# Patient Record
Sex: FEMALE | Race: BLACK OR AFRICAN AMERICAN | Employment: UNEMPLOYED | URBAN - METROPOLITAN AREA
[De-identification: names, ages, dates, MRNs, and addresses within clinical notes are randomized per-mention and may not be internally consistent; named-entity substitution may affect disease eponyms.]

---

## 2018-09-01 ENCOUNTER — APPOINTMENT (OUTPATIENT)
Dept: CT IMAGING | Age: 38
DRG: 313 | End: 2018-09-01
Payer: MEDICAID

## 2018-09-01 ENCOUNTER — HOSPITAL ENCOUNTER (INPATIENT)
Age: 38
LOS: 3 days | Discharge: NURSING, REHAB OR LONG-TERM CARE | DRG: 313 | End: 2018-09-04
Attending: EMERGENCY MEDICINE | Admitting: ORTHOPAEDIC SURGERY
Payer: MEDICAID

## 2018-09-01 ENCOUNTER — APPOINTMENT (OUTPATIENT)
Dept: GENERAL RADIOLOGY | Age: 38
DRG: 313 | End: 2018-09-01
Payer: MEDICAID

## 2018-09-01 DIAGNOSIS — S52.601A CLOSED FRACTURE OF DISTAL ENDS OF RIGHT RADIUS AND ULNA, INITIAL ENCOUNTER: ICD-10-CM

## 2018-09-01 DIAGNOSIS — S52.601A CLOSED FRACTURE DISTAL RADIUS AND ULNA, RIGHT, INITIAL ENCOUNTER: ICD-10-CM

## 2018-09-01 DIAGNOSIS — S52.501A CLOSED FRACTURE DISTAL RADIUS AND ULNA, RIGHT, INITIAL ENCOUNTER: ICD-10-CM

## 2018-09-01 DIAGNOSIS — S52.501A CLOSED FRACTURE OF DISTAL ENDS OF RIGHT RADIUS AND ULNA, INITIAL ENCOUNTER: ICD-10-CM
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
## 2018-09-01 DIAGNOSIS — S82.301A CLOSED FRACTURE OF DISTAL END OF RIGHT TIBIA, UNSPECIFIED FRACTURE MORPHOLOGY, INITIAL ENCOUNTER: ICD-10-CM

## 2018-09-01 PROBLEM — T07.XXXA MULTIPLE FRACTURES: Status: ACTIVE | Noted: 2018-09-01

## 2018-09-01 LAB
ABSOLUTE EOS #: 0 K/UL (ref 0–0.4)
ABSOLUTE IMMATURE GRANULOCYTE: 0 K/UL (ref 0–0.3)
ABSOLUTE LYMPH #: 5.04 K/UL (ref 1–4.8)
ABSOLUTE MONO #: 0.5 K/UL (ref 0.1–0.8)
AMYLASE: 49 U/L (ref 28–100)
ANION GAP SERPL CALCULATED.3IONS-SCNC: 13 MMOL/L (ref 9–17)
ATYPICAL LYMPHOCYTE ABSOLUTE COUNT: 0.34 K/UL
ATYPICAL LYMPHOCYTES: 4 %
BASOPHILS # BLD: 0 % (ref 0–2)
BASOPHILS ABSOLUTE: 0 K/UL (ref 0–0.2)
BUN BLDV-MCNC: 9 MG/DL (ref 6–20)
BUN/CREAT BLD: ABNORMAL (ref 9–20)
CALCIUM SERPL-MCNC: 8.5 MG/DL (ref 8.6–10.4)
CHLORIDE BLD-SCNC: 101 MMOL/L (ref 98–107)
CO2: 20 MMOL/L (ref 20–31)
CREAT SERPL-MCNC: 0.66 MG/DL (ref 0.5–0.9)
DIFFERENTIAL TYPE: ABNORMAL
EOSINOPHILS RELATIVE PERCENT: 0 % (ref 1–4)
ETHANOL PERCENT: <0.01 %
ETHANOL: <10 MG/DL
GFR AFRICAN AMERICAN: >60 ML/MIN
GFR NON-AFRICAN AMERICAN: >60 ML/MIN
GFR SERPL CREATININE-BSD FRML MDRD: ABNORMAL ML/MIN/{1.73_M2}
GFR SERPL CREATININE-BSD FRML MDRD: ABNORMAL ML/MIN/{1.73_M2}
GLUCOSE BLD-MCNC: 171 MG/DL (ref 70–99)
HCG QUALITATIVE: NEGATIVE
HCT VFR BLD CALC: 41.9 % (ref 36.3–47.1)
HEMOGLOBIN: 12.8 G/DL (ref 11.9–15.1)
IMMATURE GRANULOCYTES: 0 %
INR BLD: 1.1
LIPASE: 22 U/L (ref 13–60)
LYMPHOCYTES # BLD: 60 % (ref 24–44)
MCH RBC QN AUTO: 27.5 PG (ref 25.2–33.5)
MCHC RBC AUTO-ENTMCNC: 30.5 G/DL (ref 28.4–34.8)
MCV RBC AUTO: 90.1 FL (ref 82.6–102.9)
MONOCYTES # BLD: 6 % (ref 1–7)
MORPHOLOGY: NORMAL
NRBC AUTOMATED: 0 PER 100 WBC
PDW BLD-RTO: 12.3 % (ref 11.8–14.4)
PLATELET # BLD: ABNORMAL K/UL (ref 138–453)
PLATELET ESTIMATE: ABNORMAL
PLATELET, FLUORESCENCE: NORMAL K/UL (ref 138–453)
PLATELET, IMMATURE FRACTION: NORMAL % (ref 1.1–10.3)
PMV BLD AUTO: ABNORMAL FL (ref 8.1–13.5)
POTASSIUM SERPL-SCNC: 3.6 MMOL/L (ref 3.7–5.3)
PROTHROMBIN TIME: 11.6 SEC (ref 9–12)
RBC # BLD: 4.65 M/UL (ref 3.95–5.11)
RBC # BLD: ABNORMAL 10*6/UL
SEG NEUTROPHILS: 30 % (ref 36–66)
SEGMENTED NEUTROPHILS ABSOLUTE COUNT: 2.52 K/UL (ref 1.8–7.7)
SODIUM BLD-SCNC: 134 MMOL/L (ref 135–144)
WBC # BLD: 8.4 K/UL (ref 3.5–11.3)
WBC # BLD: ABNORMAL 10*3/UL

## 2018-09-01 PROCEDURE — 73590 X-RAY EXAM OF LOWER LEG: CPT

## 2018-09-01 PROCEDURE — 99157 MOD SED OTHER PHYS/QHP EA: CPT

## 2018-09-01 PROCEDURE — 0PSHXZZ REPOSITION RIGHT RADIUS, EXTERNAL APPROACH: ICD-10-PCS | Performed by: ORTHOPAEDIC SURGERY

## 2018-09-01 PROCEDURE — 73090 X-RAY EXAM OF FOREARM: CPT

## 2018-09-01 PROCEDURE — 83036 HEMOGLOBIN GLYCOSYLATED A1C: CPT

## 2018-09-01 PROCEDURE — 71045 X-RAY EXAM CHEST 1 VIEW: CPT

## 2018-09-01 PROCEDURE — 85055 RETICULATED PLATELET ASSAY: CPT

## 2018-09-01 PROCEDURE — 2580000003 HC RX 258: Performed by: STUDENT IN AN ORGANIZED HEALTH CARE EDUCATION/TRAINING PROGRAM

## 2018-09-01 PROCEDURE — 73100 X-RAY EXAM OF WRIST: CPT

## 2018-09-01 PROCEDURE — 6370000000 HC RX 637 (ALT 250 FOR IP): Performed by: EMERGENCY MEDICINE

## 2018-09-01 PROCEDURE — 73600 X-RAY EXAM OF ANKLE: CPT

## 2018-09-01 PROCEDURE — 6360000002 HC RX W HCPCS: Performed by: STUDENT IN AN ORGANIZED HEALTH CARE EDUCATION/TRAINING PROGRAM

## 2018-09-01 PROCEDURE — 99156 MOD SED OTH PHYS/QHP 5/>YRS: CPT

## 2018-09-01 PROCEDURE — 27810 TREATMENT OF ANKLE FRACTURE: CPT

## 2018-09-01 PROCEDURE — 82150 ASSAY OF AMYLASE: CPT

## 2018-09-01 PROCEDURE — 6370000000 HC RX 637 (ALT 250 FOR IP): Performed by: STUDENT IN AN ORGANIZED HEALTH CARE EDUCATION/TRAINING PROGRAM

## 2018-09-01 PROCEDURE — 85610 PROTHROMBIN TIME: CPT

## 2018-09-01 PROCEDURE — 72125 CT NECK SPINE W/O DYE: CPT

## 2018-09-01 PROCEDURE — 6360000002 HC RX W HCPCS: Performed by: EMERGENCY MEDICINE

## 2018-09-01 PROCEDURE — 99254 IP/OBS CNSLTJ NEW/EST MOD 60: CPT | Performed by: INTERNAL MEDICINE

## 2018-09-01 PROCEDURE — 0SSFXZZ REPOSITION RIGHT ANKLE JOINT, EXTERNAL APPROACH: ICD-10-PCS | Performed by: ORTHOPAEDIC SURGERY

## 2018-09-01 PROCEDURE — G0480 DRUG TEST DEF 1-7 CLASSES: HCPCS

## 2018-09-01 PROCEDURE — 85025 COMPLETE CBC W/AUTO DIFF WBC: CPT

## 2018-09-01 PROCEDURE — 25605 CLTX DST RDL FX/EPHYS SEP W/: CPT

## 2018-09-01 PROCEDURE — 1200000000 HC SEMI PRIVATE

## 2018-09-01 PROCEDURE — 80048 BASIC METABOLIC PNL TOTAL CA: CPT

## 2018-09-01 PROCEDURE — 70450 CT HEAD/BRAIN W/O DYE: CPT

## 2018-09-01 PROCEDURE — 83690 ASSAY OF LIPASE: CPT

## 2018-09-01 PROCEDURE — 73610 X-RAY EXAM OF ANKLE: CPT

## 2018-09-01 PROCEDURE — 84703 CHORIONIC GONADOTROPIN ASSAY: CPT

## 2018-09-01 PROCEDURE — 99285 EMERGENCY DEPT VISIT HI MDM: CPT

## 2018-09-01 PROCEDURE — 96374 THER/PROPH/DIAG INJ IV PUSH: CPT

## 2018-09-01 PROCEDURE — 73110 X-RAY EXAM OF WRIST: CPT

## 2018-09-01 PROCEDURE — 0PSKXZZ REPOSITION RIGHT ULNA, EXTERNAL APPROACH: ICD-10-PCS | Performed by: ORTHOPAEDIC SURGERY

## 2018-09-01 PROCEDURE — 96376 TX/PRO/DX INJ SAME DRUG ADON: CPT

## 2018-09-01 RX ORDER — PROPOFOL 10 MG/ML
1 INJECTION, EMULSION INTRAVENOUS ONCE
Status: DISCONTINUED | OUTPATIENT
Start: 2018-09-01 | End: 2018-09-02

## 2018-09-01 RX ORDER — OXYCODONE HYDROCHLORIDE AND ACETAMINOPHEN 5; 325 MG/1; MG/1
1 TABLET ORAL EVERY 4 HOURS PRN
Status: DISCONTINUED | OUTPATIENT
Start: 2018-09-01 | End: 2018-09-02

## 2018-09-01 RX ORDER — HYDRALAZINE HYDROCHLORIDE 20 MG/ML
5 INJECTION INTRAMUSCULAR; INTRAVENOUS EVERY 6 HOURS PRN
Status: DISCONTINUED | OUTPATIENT
Start: 2018-09-01 | End: 2018-09-04 | Stop reason: HOSPADM

## 2018-09-01 RX ORDER — SODIUM CHLORIDE 0.9 % (FLUSH) 0.9 %
10 SYRINGE (ML) INJECTION EVERY 12 HOURS SCHEDULED
Status: DISCONTINUED | OUTPATIENT
Start: 2018-09-01 | End: 2018-09-04 | Stop reason: HOSPADM

## 2018-09-01 RX ORDER — FENTANYL CITRATE 50 UG/ML
INJECTION, SOLUTION INTRAMUSCULAR; INTRAVENOUS
Status: DISCONTINUED
Start: 2018-09-01 | End: 2018-09-01 | Stop reason: WASHOUT

## 2018-09-01 RX ORDER — SODIUM CHLORIDE, SODIUM LACTATE, POTASSIUM CHLORIDE, CALCIUM CHLORIDE 600; 310; 30; 20 MG/100ML; MG/100ML; MG/100ML; MG/100ML
INJECTION, SOLUTION INTRAVENOUS CONTINUOUS
Status: DISCONTINUED | OUTPATIENT
Start: 2018-09-01 | End: 2018-09-02

## 2018-09-01 RX ORDER — PROPOFOL 10 MG/ML
INJECTION, EMULSION INTRAVENOUS CONTINUOUS PRN
Status: COMPLETED | OUTPATIENT
Start: 2018-09-01 | End: 2018-09-01

## 2018-09-01 RX ORDER — MORPHINE SULFATE 4 MG/ML
4 INJECTION, SOLUTION INTRAMUSCULAR; INTRAVENOUS
Status: DISCONTINUED | OUTPATIENT
Start: 2018-09-01 | End: 2018-09-04 | Stop reason: HOSPADM

## 2018-09-01 RX ORDER — FENTANYL CITRATE 50 UG/ML
50 INJECTION, SOLUTION INTRAMUSCULAR; INTRAVENOUS ONCE
Status: COMPLETED | OUTPATIENT
Start: 2018-09-01 | End: 2018-09-01

## 2018-09-01 RX ORDER — OXYCODONE HYDROCHLORIDE AND ACETAMINOPHEN 5; 325 MG/1; MG/1
1 TABLET ORAL EVERY 4 HOURS PRN
Status: DISCONTINUED | OUTPATIENT
Start: 2018-09-01 | End: 2018-09-01

## 2018-09-01 RX ORDER — OXYCODONE HYDROCHLORIDE AND ACETAMINOPHEN 5; 325 MG/1; MG/1
2 TABLET ORAL EVERY 4 HOURS PRN
Status: DISCONTINUED | OUTPATIENT
Start: 2018-09-01 | End: 2018-09-02

## 2018-09-01 RX ORDER — SODIUM CHLORIDE 0.9 % (FLUSH) 0.9 %
10 SYRINGE (ML) INJECTION PRN
Status: DISCONTINUED | OUTPATIENT
Start: 2018-09-01 | End: 2018-09-04 | Stop reason: HOSPADM

## 2018-09-01 RX ADMIN — MORPHINE SULFATE 4 MG: 4 INJECTION INTRAVENOUS at 18:57

## 2018-09-01 RX ADMIN — OXYCODONE HYDROCHLORIDE AND ACETAMINOPHEN 2 TABLET: 5; 325 TABLET ORAL at 21:43

## 2018-09-01 RX ADMIN — Medication 50 MCG: at 16:03

## 2018-09-01 RX ADMIN — FENTANYL CITRATE 50 MCG: 50 INJECTION, SOLUTION INTRAMUSCULAR; INTRAVENOUS at 14:43

## 2018-09-01 RX ADMIN — OXYCODONE HYDROCHLORIDE AND ACETAMINOPHEN 1 TABLET: 5; 325 TABLET ORAL at 18:02

## 2018-09-01 RX ADMIN — FENTANYL CITRATE 50 MCG: 50 INJECTION INTRAMUSCULAR; INTRAVENOUS at 17:22

## 2018-09-01 RX ADMIN — MORPHINE SULFATE 4 MG: 4 INJECTION INTRAVENOUS at 23:22

## 2018-09-01 RX ADMIN — HYDRALAZINE HYDROCHLORIDE 5 MG: 20 INJECTION, SOLUTION INTRAMUSCULAR; INTRAVENOUS at 18:56

## 2018-09-01 RX ADMIN — PROPOFOL 30 MG: 10 INJECTION, EMULSION INTRAVENOUS at 15:33

## 2018-09-01 RX ADMIN — SODIUM CHLORIDE, POTASSIUM CHLORIDE, SODIUM LACTATE AND CALCIUM CHLORIDE: 600; 310; 30; 20 INJECTION, SOLUTION INTRAVENOUS at 18:57

## 2018-09-01 ASSESSMENT — PAIN SCALES - GENERAL
PAINLEVEL_OUTOF10: 5
PAINLEVEL_OUTOF10: 10
PAINLEVEL_OUTOF10: 8
PAINLEVEL_OUTOF10: 9
PAINLEVEL_OUTOF10: 7
PAINLEVEL_OUTOF10: 8
PAINLEVEL_OUTOF10: 7
PAINLEVEL_OUTOF10: 10
PAINLEVEL_OUTOF10: 10

## 2018-09-01 ASSESSMENT — PAIN DESCRIPTION - PAIN TYPE
TYPE: ACUTE PAIN
TYPE: ACUTE PAIN

## 2018-09-01 ASSESSMENT — PAIN DESCRIPTION - LOCATION
LOCATION: ANKLE;WRIST
LOCATION: ANKLE;WRIST

## 2018-09-01 ASSESSMENT — PAIN DESCRIPTION - ORIENTATION
ORIENTATION: RIGHT
ORIENTATION: RIGHT

## 2018-09-01 NOTE — ED NOTES
Pt assisted with bedpan. Pt asking from pain medication, resident notified.       Howard Stanley RN  09/01/18 [id number]

## 2018-09-01 NOTE — ED PROVIDER NOTES
history as documented unless otherwise noted below. Documentation of the HPI, Physical Exam and Medical Decision Making performed by medical students or scribes is based on my personal performance of the HPI, PE and MDM. For Phys Assistant/ Nurse Practitioner cases/documentation I have had a face to face evaluation of this patient and have completed at least one if not all key elements of the E/M (history, physical exam, and MDM). Additional findings are as noted. For APC cases I have personally evaluated and examined the patient in conjunction with the APC and agree with the treatment plan and disposition of the patient as recorded by the APC.     Liliana Mauro MD  Attending Emergency  Physician        Marissa Abbott MD  09/01/18 3592

## 2018-09-01 NOTE — CONSULTS
190 lb (86.2 kg)   SpO2 98%   BMI 32.61 kg/m²     Gen: AAOx3, NAD, cooperative    Head: normocephalic, atraumatic     Neck: supple     Chest: Non labored breathing, b/l clavicles without TTP, crepitus, step off, or deformity   Cardiovascular: Regular rate, no dependent edema, distal pulses 2+     Respiratory: Chest symmetric, no accessory muscle use, normal respirations     RUE: No ecchymosis. Deformity of wrist noted. Skin intact. Tender to palpation. Full AROM without pain shoulder/elbow/fingers. Compartments soft and compressible. Ulnar/Median/AIN/PIN motor intact. Median/Radial/Ulnar nerve SILT. Hand and fingers warm and well-perfused w/ BCR; radial pulse 2+. LUE: No ecchymosis or deformities. Skin intact. Non tender to palpation. Full AROM without pain shoulder/elbow/wrist/fingers. Compartments soft and compressible. Ulnar/Median/AIN/PIN motor intact. Median/Radial/Ulnar nerve SILT. Hand and fingers warm and well-perfused w/ BCR; radial pulse 2+. RLE: No ecchymosis. Moderate swelling. Significant deformities to right ankle. Skin intact. Tender to palpation. Compartments soft and compressible. EHL/FHL/TA/GSC gross motor intact. Sural, saphenous, superificial/deep peroneal, and plantar nerve distribution SILT. Foot and toes warm and well-perfused w/ BCR; DP pulses 2+. -log roll. LLE: No ecchymosis or deformities. Skin intact. Non tender to palpation. Compartments soft and compressible. EHL/FHL/TA/GSC gross motor intact. Sural, saphenous, superificial/deep peroneal, and plantar nerve distribution SILT. Foot and toes warm and well-perfused w/ BCR; DP pulses 2+. -log roll. Knee ligaments grossly intact.     LABS:  Recent Labs      09/01/18   1423   WBC  8.4   HGB  12.8   HCT  41.9   PLT  See Reflexed IPF Result   INR  1.1   NA  134*   K  3.6*   BUN  9   CREATININE  0.66   GLUCOSE  171*        Radiology:   X-rays on 9/1//18 of right wrist demonstrate distal radius/ulna fracture  X-rays on 9/1/18 of right procedure. Under IV sedation by ED staff we proceeded to manually reduce the fracture with appreciable motion indicating improved alignment. At this time post reduction films were obtained demonstrating improved interval alignment. Splint was applied at this point and post splint films were obtained showing a stable reduction. Patient tolerated the procedure well and expressed interval improvement in symptoms. Post reduction neurovascular exam remained unchanged. All questions and concerns were addressed at this point.
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
## 2018-09-01 NOTE — ED NOTES
Pt placed on cardiac monitor, BP cuff, and pulse ox. Alarms set.       Katrin Stanley RN  09/01/18 [id number]
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
## 2018-09-02 ENCOUNTER — APPOINTMENT (OUTPATIENT)
Dept: GENERAL RADIOLOGY | Age: 38
DRG: 313 | End: 2018-09-02
Payer: MEDICAID

## 2018-09-02 ENCOUNTER — ANESTHESIA (OUTPATIENT)
Dept: OPERATING ROOM | Age: 38
DRG: 313 | End: 2018-09-02
Payer: MEDICAID

## 2018-09-02 ENCOUNTER — ANESTHESIA EVENT (OUTPATIENT)
Dept: OPERATING ROOM | Age: 38
DRG: 313 | End: 2018-09-02
Payer: MEDICAID

## 2018-09-02 VITALS — SYSTOLIC BLOOD PRESSURE: 146 MMHG | OXYGEN SATURATION: 98 % | DIASTOLIC BLOOD PRESSURE: 97 MMHG

## 2018-09-02 LAB
ESTIMATED AVERAGE GLUCOSE: 154 MG/DL
HBA1C MFR BLD: 7 % (ref 4–6)

## 2018-09-02 PROCEDURE — 6370000000 HC RX 637 (ALT 250 FOR IP): Performed by: STUDENT IN AN ORGANIZED HEALTH CARE EDUCATION/TRAINING PROGRAM

## 2018-09-02 PROCEDURE — 0QSG04Z REPOSITION RIGHT TIBIA WITH INTERNAL FIXATION DEVICE, OPEN APPROACH: ICD-10-PCS | Performed by: ORTHOPAEDIC SURGERY

## 2018-09-02 PROCEDURE — 0QSJ04Z REPOSITION RIGHT FIBULA WITH INTERNAL FIXATION DEVICE, OPEN APPROACH: ICD-10-PCS | Performed by: ORTHOPAEDIC SURGERY

## 2018-09-02 PROCEDURE — 64447 NJX AA&/STRD FEMORAL NRV IMG: CPT | Performed by: ANESTHESIOLOGY

## 2018-09-02 PROCEDURE — 82306 VITAMIN D 25 HYDROXY: CPT

## 2018-09-02 PROCEDURE — C1713 ANCHOR/SCREW BN/BN,TIS/BN: HCPCS | Performed by: ORTHOPAEDIC SURGERY

## 2018-09-02 PROCEDURE — 6360000002 HC RX W HCPCS: Performed by: NURSE ANESTHETIST, CERTIFIED REGISTERED

## 2018-09-02 PROCEDURE — 6360000002 HC RX W HCPCS: Performed by: STUDENT IN AN ORGANIZED HEALTH CARE EDUCATION/TRAINING PROGRAM

## 2018-09-02 PROCEDURE — 7100000001 HC PACU RECOVERY - ADDTL 15 MIN: Performed by: ORTHOPAEDIC SURGERY

## 2018-09-02 PROCEDURE — 73100 X-RAY EXAM OF WRIST: CPT

## 2018-09-02 PROCEDURE — 1200000000 HC SEMI PRIVATE

## 2018-09-02 PROCEDURE — 64445 NJX AA&/STRD SCIATIC NRV IMG: CPT | Performed by: ANESTHESIOLOGY

## 2018-09-02 PROCEDURE — 94762 N-INVAS EAR/PLS OXIMTRY CONT: CPT

## 2018-09-02 PROCEDURE — 36415 COLL VENOUS BLD VENIPUNCTURE: CPT

## 2018-09-02 PROCEDURE — 7100000000 HC PACU RECOVERY - FIRST 15 MIN: Performed by: ORTHOPAEDIC SURGERY

## 2018-09-02 PROCEDURE — 64415 NJX AA&/STRD BRCH PLXS IMG: CPT | Performed by: ANESTHESIOLOGY

## 2018-09-02 PROCEDURE — C1769 GUIDE WIRE: HCPCS | Performed by: ORTHOPAEDIC SURGERY

## 2018-09-02 PROCEDURE — 3600000004 HC SURGERY LEVEL 4 BASE: Performed by: ORTHOPAEDIC SURGERY

## 2018-09-02 PROCEDURE — 2580000003 HC RX 258: Performed by: STUDENT IN AN ORGANIZED HEALTH CARE EDUCATION/TRAINING PROGRAM

## 2018-09-02 PROCEDURE — 73610 X-RAY EXAM OF ANKLE: CPT

## 2018-09-02 PROCEDURE — 0PSH04Z REPOSITION RIGHT RADIUS WITH INTERNAL FIXATION DEVICE, OPEN APPROACH: ICD-10-PCS | Performed by: ORTHOPAEDIC SURGERY

## 2018-09-02 PROCEDURE — 3700000001 HC ADD 15 MINUTES (ANESTHESIA): Performed by: ORTHOPAEDIC SURGERY

## 2018-09-02 PROCEDURE — 3600000014 HC SURGERY LEVEL 4 ADDTL 15MIN: Performed by: ORTHOPAEDIC SURGERY

## 2018-09-02 PROCEDURE — 2500000003 HC RX 250 WO HCPCS: Performed by: NURSE ANESTHETIST, CERTIFIED REGISTERED

## 2018-09-02 PROCEDURE — 2580000003 HC RX 258: Performed by: EMERGENCY MEDICINE

## 2018-09-02 PROCEDURE — 2720000010 HC SURG SUPPLY STERILE: Performed by: ORTHOPAEDIC SURGERY

## 2018-09-02 PROCEDURE — 3700000000 HC ANESTHESIA ATTENDED CARE: Performed by: ORTHOPAEDIC SURGERY

## 2018-09-02 PROCEDURE — 2709999900 HC NON-CHARGEABLE SUPPLY: Performed by: ORTHOPAEDIC SURGERY

## 2018-09-02 DEVICE — PIN FIX L22MM DIA1.8MM S STL BTTRS VAR ANG LOK T8 STARDRV: Type: IMPLANTABLE DEVICE | Status: FUNCTIONAL

## 2018-09-02 DEVICE — SCREW BNE L34MM DIA3.5MM CORT S STL ST NONCANNULATED LOK: Type: IMPLANTABLE DEVICE | Status: FUNCTIONAL

## 2018-09-02 DEVICE — SCREW BNE L30MM DIA3.5MM CORT S STL ST NONCANNULATED LOK: Type: IMPLANTABLE DEVICE | Status: FUNCTIONAL

## 2018-09-02 DEVICE — IMPLANTABLE DEVICE: Type: IMPLANTABLE DEVICE | Status: FUNCTIONAL

## 2018-09-02 DEVICE — PIN FIX L20MM DIA1.8MM S STL BTTRS VAR ANG LOK T8 STARDRV: Type: IMPLANTABLE DEVICE | Status: FUNCTIONAL

## 2018-09-02 DEVICE — PLATE BNE W22XL54MM STD 9 H R DST RAD VOLAR S STL VAR ANG: Type: IMPLANTABLE DEVICE | Status: FUNCTIONAL

## 2018-09-02 DEVICE — SCREW BNE L16MM DIA2.7MM CORT S STL ST T8 STARDRV RECESS: Type: IMPLANTABLE DEVICE | Status: FUNCTIONAL

## 2018-09-02 DEVICE — SCREW BNE L38MM DIA3.5MM CORT S STL ST NONCANNULATED LOK: Type: IMPLANTABLE DEVICE | Status: FUNCTIONAL

## 2018-09-02 DEVICE — PIN FIX L18MM DIA1.8MM DST RAD S STL BTTRS VAR ANG LOK T8: Type: IMPLANTABLE DEVICE | Status: FUNCTIONAL

## 2018-09-02 DEVICE — SCREW BNE L22MM DIA2.4MM DST RAD VOLAR S STL ST VAR ANG LOK: Type: IMPLANTABLE DEVICE | Status: FUNCTIONAL

## 2018-09-02 DEVICE — SCREW BNE L18MM DIA2.7MM CORT S STL ST T8 STARDRV RECESS: Type: IMPLANTABLE DEVICE | Status: FUNCTIONAL

## 2018-09-02 RX ORDER — CEFAZOLIN SODIUM 1 G/3ML
INJECTION, POWDER, FOR SOLUTION INTRAMUSCULAR; INTRAVENOUS PRN
Status: DISCONTINUED | OUTPATIENT
Start: 2018-09-02 | End: 2018-09-02 | Stop reason: SDUPTHER

## 2018-09-02 RX ORDER — PROPOFOL 10 MG/ML
INJECTION, EMULSION INTRAVENOUS PRN
Status: DISCONTINUED | OUTPATIENT
Start: 2018-09-02 | End: 2018-09-02 | Stop reason: SDUPTHER

## 2018-09-02 RX ORDER — OXYCODONE HYDROCHLORIDE 5 MG/1
5 TABLET ORAL EVERY 4 HOURS PRN
Status: DISCONTINUED | OUTPATIENT
Start: 2018-09-02 | End: 2018-09-04 | Stop reason: HOSPADM

## 2018-09-02 RX ORDER — ONDANSETRON 2 MG/ML
INJECTION INTRAMUSCULAR; INTRAVENOUS PRN
Status: DISCONTINUED | OUTPATIENT
Start: 2018-09-02 | End: 2018-09-02 | Stop reason: SDUPTHER

## 2018-09-02 RX ORDER — KETOROLAC TROMETHAMINE 30 MG/ML
INJECTION, SOLUTION INTRAMUSCULAR; INTRAVENOUS PRN
Status: DISCONTINUED | OUTPATIENT
Start: 2018-09-02 | End: 2018-09-02 | Stop reason: SDUPTHER

## 2018-09-02 RX ORDER — OXYCODONE HYDROCHLORIDE 5 MG/1
10 TABLET ORAL EVERY 4 HOURS PRN
Status: DISCONTINUED | OUTPATIENT
Start: 2018-09-02 | End: 2018-09-04 | Stop reason: HOSPADM

## 2018-09-02 RX ORDER — LIDOCAINE HYDROCHLORIDE 10 MG/ML
INJECTION, SOLUTION EPIDURAL; INFILTRATION; INTRACAUDAL; PERINEURAL PRN
Status: DISCONTINUED | OUTPATIENT
Start: 2018-09-02 | End: 2018-09-02 | Stop reason: SDUPTHER

## 2018-09-02 RX ORDER — GLYCOPYRROLATE 1 MG/5 ML
SYRINGE (ML) INTRAVENOUS PRN
Status: DISCONTINUED | OUTPATIENT
Start: 2018-09-02 | End: 2018-09-02 | Stop reason: SDUPTHER

## 2018-09-02 RX ADMIN — SODIUM CHLORIDE, POTASSIUM CHLORIDE, SODIUM LACTATE AND CALCIUM CHLORIDE: 600; 310; 30; 20 INJECTION, SOLUTION INTRAVENOUS at 08:08

## 2018-09-02 RX ADMIN — PHENYLEPHRINE HYDROCHLORIDE 200 MCG: 10 INJECTION INTRAVENOUS at 08:20

## 2018-09-02 RX ADMIN — OXYCODONE HYDROCHLORIDE 10 MG: 5 TABLET ORAL at 15:32

## 2018-09-02 RX ADMIN — Medication 5 ML: at 07:59

## 2018-09-02 RX ADMIN — PROPOFOL 200 MG: 10 INJECTION, EMULSION INTRAVENOUS at 07:57

## 2018-09-02 RX ADMIN — KETOROLAC TROMETHAMINE 30 MG: 30 INJECTION, SOLUTION INTRAMUSCULAR at 09:34

## 2018-09-02 RX ADMIN — OXYCODONE HYDROCHLORIDE 10 MG: 5 TABLET ORAL at 21:48

## 2018-09-02 RX ADMIN — MORPHINE SULFATE 4 MG: 4 INJECTION INTRAVENOUS at 06:42

## 2018-09-02 RX ADMIN — CEFAZOLIN 2000 MG: 330 INJECTION, POWDER, FOR SOLUTION INTRAMUSCULAR; INTRAVENOUS at 07:43

## 2018-09-02 RX ADMIN — DEXTROSE MONOHYDRATE 2 G: 50 INJECTION, SOLUTION INTRAVENOUS at 15:37

## 2018-09-02 RX ADMIN — Medication 0.2 MG: at 07:59

## 2018-09-02 RX ADMIN — PHENYLEPHRINE HYDROCHLORIDE 200 MCG: 10 INJECTION INTRAVENOUS at 07:59

## 2018-09-02 RX ADMIN — Medication 10 ML: at 21:50

## 2018-09-02 RX ADMIN — PHENYLEPHRINE HYDROCHLORIDE 200 MCG: 10 INJECTION INTRAVENOUS at 08:03

## 2018-09-02 RX ADMIN — LIDOCAINE HYDROCHLORIDE 50 MG: 10 INJECTION, SOLUTION EPIDURAL; INFILTRATION; INTRACAUDAL; PERINEURAL at 07:57

## 2018-09-02 RX ADMIN — ONDANSETRON 4 MG: 2 INJECTION INTRAMUSCULAR; INTRAVENOUS at 09:34

## 2018-09-02 RX ADMIN — OXYCODONE HYDROCHLORIDE AND ACETAMINOPHEN 2 TABLET: 5; 325 TABLET ORAL at 05:43

## 2018-09-02 ASSESSMENT — PULMONARY FUNCTION TESTS
PIF_VALUE: 0
PIF_VALUE: 8
PIF_VALUE: 11
PIF_VALUE: 2
PIF_VALUE: 9
PIF_VALUE: 12
PIF_VALUE: 11
PIF_VALUE: 3
PIF_VALUE: 11
PIF_VALUE: 12
PIF_VALUE: 11
PIF_VALUE: 11
PIF_VALUE: 0
PIF_VALUE: 11
PIF_VALUE: 9
PIF_VALUE: 3
PIF_VALUE: 0
PIF_VALUE: 5
PIF_VALUE: 0
PIF_VALUE: 3
PIF_VALUE: 11
PIF_VALUE: 3
PIF_VALUE: 11
PIF_VALUE: 11
PIF_VALUE: 3
PIF_VALUE: 0
PIF_VALUE: 1
PIF_VALUE: 9
PIF_VALUE: 3
PIF_VALUE: 11
PIF_VALUE: 1
PIF_VALUE: 11
PIF_VALUE: 8
PIF_VALUE: 3
PIF_VALUE: 9
PIF_VALUE: 0
PIF_VALUE: 11
PIF_VALUE: 8
PIF_VALUE: 11
PIF_VALUE: 8
PIF_VALUE: 3
PIF_VALUE: 11
PIF_VALUE: 3
PIF_VALUE: 10
PIF_VALUE: 11
PIF_VALUE: 1
PIF_VALUE: 3
PIF_VALUE: 9
PIF_VALUE: 8
PIF_VALUE: 16
PIF_VALUE: 3
PIF_VALUE: 3
PIF_VALUE: 15
PIF_VALUE: 9
PIF_VALUE: 1
PIF_VALUE: 9
PIF_VALUE: 8
PIF_VALUE: 19
PIF_VALUE: 11
PIF_VALUE: 6
PIF_VALUE: 9
PIF_VALUE: 11
PIF_VALUE: 9
PIF_VALUE: 11
PIF_VALUE: 11
PIF_VALUE: 9
PIF_VALUE: 8
PIF_VALUE: 0
PIF_VALUE: 0
PIF_VALUE: 11
PIF_VALUE: 1
PIF_VALUE: 8
PIF_VALUE: 9
PIF_VALUE: 9
PIF_VALUE: 1
PIF_VALUE: 0
PIF_VALUE: 11
PIF_VALUE: 11
PIF_VALUE: 8
PIF_VALUE: 11
PIF_VALUE: 1
PIF_VALUE: 5
PIF_VALUE: 11
PIF_VALUE: 11
PIF_VALUE: 1
PIF_VALUE: 11
PIF_VALUE: 9
PIF_VALUE: 11
PIF_VALUE: 16
PIF_VALUE: 13
PIF_VALUE: 0
PIF_VALUE: 11
PIF_VALUE: 11
PIF_VALUE: 2
PIF_VALUE: 9
PIF_VALUE: 12
PIF_VALUE: 11
PIF_VALUE: 11
PIF_VALUE: 0
PIF_VALUE: 9
PIF_VALUE: 11
PIF_VALUE: 24
PIF_VALUE: 11
PIF_VALUE: 2
PIF_VALUE: 9
PIF_VALUE: 13
PIF_VALUE: 8
PIF_VALUE: 11
PIF_VALUE: 11
PIF_VALUE: 0
PIF_VALUE: 9
PIF_VALUE: 9
PIF_VALUE: 0
PIF_VALUE: 11
PIF_VALUE: 9
PIF_VALUE: 3
PIF_VALUE: 9
PIF_VALUE: 11
PIF_VALUE: 16
PIF_VALUE: 11
PIF_VALUE: 3
PIF_VALUE: 5
PIF_VALUE: 5
PIF_VALUE: 11
PIF_VALUE: 8
PIF_VALUE: 11
PIF_VALUE: 9
PIF_VALUE: 1
PIF_VALUE: 11
PIF_VALUE: 8
PIF_VALUE: 15
PIF_VALUE: 3
PIF_VALUE: 11
PIF_VALUE: 8

## 2018-09-02 ASSESSMENT — PAIN SCALES - GENERAL
PAINLEVEL_OUTOF10: 8
PAINLEVEL_OUTOF10: 10
PAINLEVEL_OUTOF10: 9
PAINLEVEL_OUTOF10: 0
PAINLEVEL_OUTOF10: 0
PAINLEVEL_OUTOF10: 7

## 2018-09-02 NOTE — PROGRESS NOTES
Progress Note    Patient:  Abhi Lincoln  YOB: 1980     43 y.o. female    Subjective:  Patient seen and examined at bedside this morning. No new complaints or concerns per patient this morning. No acute issues overnight per nursing. Pain well-controlled. Slept well overnight. Evaluated by internal medicine. Plan for ORIF right radius & ulna and ORIF right ankle discussed again with patient this morning. Questions and concerns answered and she is in agreement with ORIF today. Denies: fever/chills, HA, CP, SOB, N/V, dysuria, or numbness/tingling in extremities. -BM/+flatus. PT/OT to evaluate post-op. Objective:   Vitals:    09/01/18 2115   BP: (!) 147/91   Pulse: 79   Resp: 18   Temp:    SpO2: 100%     Gen: NAD, cooperative, sleeping comfortably upon entering room  Cardiovascular: Regular rate, no dependent edema, distal pulses 2+  Respiratory: Chest symmetric, no accessory muscle use, normal respirations  RUE: Sugar-tong splint in place, c/d/i. Compartments soft and compressible. Fingers warm and perfused w/ BCR. Mild fusiform swelling to all digits. Patient actively flexes and extends all digits on command. Median/Radial/Ulnar nerve SILT. RLE: Short leg posterior splint with stirrups in place, c/d/i. Compartments soft and compressible. Patient actively flexes and extends all toes on command. Sural, saphenous, superificial/deep peroneal, and plantar nerve distribution SILT. Foot and exposed toes warm and perfused w/ BCR. Recent Labs      09/01/18   1423   WBC  8.4   HGB  12.8   HCT  41.9   PLT  See Reflexed IPF Result   INR  1.1   NA  134*   K  3.6*   BUN  9   CREATININE  0.66   GLUCOSE  171*     Meds: Lovenox   See rec for complete list    Impression:  -R distal radius and ulna fracture  -R bimalleolar ankle fracture-dislocation    Plan:    -Maintain short leg posterior splint with stirrups and sugar-tong splint in place to RLE and RUE. Keep clean and dry. Do not remove.   -To OR
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
## 2018-09-02 NOTE — ANESTHESIA PROCEDURE NOTES
Peripheral Block    Patient location during procedure: pre-op  Staffing  Anesthesiologist: Jo Rabago  Performed: anesthesiologist   Preanesthetic Checklist  Completed: patient identified, site marked, surgical consent, pre-op evaluation, timeout performed, IV checked, risks and benefits discussed, monitors and equipment checked, anesthesia consent given, oxygen available and patient being monitored  Peripheral Block  Prep: ChloraPrep  Patient monitoring: cardiac monitor, continuous pulse ox, frequent blood pressure checks and IV access  Block type: Sciatic  Laterality: right  Injection technique: single-shot  Procedures: ultrasound guided  Local infiltration: lidocaine  Infiltration strength: 1 %  Dose: 3 mL  Popliteal  Provider prep: mask and sterile gloves  Local infiltration: lidocaine  Needle  Needle gauge: 21 G  Needle length: 10 cm  Needle localization: ultrasound guidance  Assessment  Injection assessment: negative aspiration for heme, no paresthesia on injection and local visualized surrounding nerve on ultrasound  Paresthesia pain: none  Slow fractionated injection: yes  Hemodynamics: stable  Additional Notes  Immediately prior to procedure a \"time out\" was called to verify the correct patient, allergies, laterality, procedure and equipment. Time out performed with Rupal Joseph CRNA    Local Anesthetic: 0.5 %  Bupivacaine   Amount: 25 ml  in 5 ml increments after negative aspiration each time.         Reason for block: post-op pain management and at surgeon's request
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
## 2018-09-02 NOTE — ANESTHESIA PRE PROCEDURE
Department of Anesthesiology  Preprocedure Note       Name:  Anuel Majano   Age:  43 y.o.  :  1980                                          MRN:  [id number]         Date:  2018      Surgeon: Jonah Marr):  Paige River MD    Procedure: Procedure(s):  RADIUS OPEN REDUCTION INTERNAL FIXATION  ANKLE OPEN REDUCTION INTERNAL FIXATION    Medications prior to admission:   Prior to Admission medications    Not on File       Current medications:    Current Facility-Administered Medications   Medication Dose Route Frequency Provider Last Rate Last Dose    propofol 1000 MG/100ML injection 86 mg  1 mg/kg Intravenous Once Telma Kathleen DO        sodium chloride flush 0.9 % injection 10 mL  10 mL Intravenous 2 times per day Lenore Colindres MD        sodium chloride flush 0.9 % injection 10 mL  10 mL Intravenous PRN Lenore Colindres MD        enoxaparin (LOVENOX) injection 40 mg  40 mg Subcutaneous Daily Lenore Colindres MD        ceFAZolin (ANCEF) 2 g in dextrose 5 % 50 mL IVPB  2 g Intravenous On Call to [address] [place] [address],[address], DO   Stopped at 18 0039    lactated ringers infusion   Intravenous Continuous [place] Ulisses Nazario,  mL/hr at 18 1857      morphine (PF) injection 4 mg  4 mg Intravenous Q3H PRN Simonne Fallen, DO   4 mg at 18 0237    oxyCODONE-acetaminophen (PERCOCET) 5-325 MG per tablet 1 tablet  1 tablet Oral Q4H PRN Simonne Fallen, DO        Or    oxyCODONE-acetaminophen (PERCOCET) 5-325 MG per tablet 2 tablet  2 tablet Oral Q4H PRN Simonne Fallen, DO   2 tablet at 18 0543    hydrALAZINE (APRESOLINE) injection 5 mg  5 mg Intravenous Q6H PRN [place] Ulisses Nazario, DO   5 mg at 18 1856       Allergies:  No Known Allergies    Problem List:    Patient Active Problem List   Diagnosis Code    Closed fracture distal radius and ulna, right, initial encounter S52.501A, S52.601A    Multiple fractures T07. Michelel Jhaveri       Past Medical History: 26.5 01/07/2013       HCG (If Applicable): No results found for: PREGTESTUR, PREGSERUM, HCG, HCGQUANT     ABGs: No results found for: PHART, PO2ART, TKA9WMR, EEX1TST, BEART, X5XCVDFY     Type & Screen (If Applicable):  No results found for: LABABO, [address]    Anesthesia Evaluation  Patient summary reviewed and Nursing notes reviewed no history of anesthetic complications:   Airway: Mallampati: II  TM distance: >3 FB   Neck ROM: full  Mouth opening: > = 3 FB Dental: normal exam         Pulmonary:Negative Pulmonary ROS and normal exam                               Cardiovascular:  Exercise tolerance: good (>4 METS),       (-) past MI, CAD, CABG/stent, dysrhythmias,  angina,  CHF, orthopnea and PND       Beta Blocker:  Not on Beta Blocker         Neuro/Psych:   (+) psychiatric history:            GI/Hepatic/Renal: Neg GI/Hepatic/Renal ROS            Endo/Other: Negative Endo/Other ROS                    Abdominal:           Vascular: negative vascular ROS. Anesthesia Plan      general and regional     ASA 2       Induction: intravenous. Anesthetic plan and risks discussed with patient.       Plan discussed with CRNA and surgical team.                  Leigh Augustin MD   9/2/2018
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
## 2018-09-02 NOTE — ANESTHESIA PROCEDURE NOTES
Peripheral Block    Patient location during procedure: pre-op  Staffing  Anesthesiologist: Moreno Zepeda  Performed: anesthesiologist   Preanesthetic Checklist  Completed: patient identified, site marked, surgical consent, pre-op evaluation, timeout performed, IV checked, risks and benefits discussed, monitors and equipment checked, anesthesia consent given, oxygen available and patient being monitored  Peripheral Block  Patient position: sitting  Prep: ChloraPrep  Patient monitoring: cardiac monitor, continuous pulse ox, frequent blood pressure checks and IV access  Block type: Brachial plexus  Laterality: right  Injection technique: single-shot  Procedures: ultrasound guided  Local infiltration: lidocaine  Infiltration strength: 1 %  Dose: 3 mL  Supraclavicular  Provider prep: mask and sterile gloves  Local infiltration: lidocaine  Needle  Needle gauge: 21 G  Needle length: 10 cm  Needle localization: ultrasound guidance  Assessment  Injection assessment: negative aspiration for heme, no paresthesia on injection and local visualized surrounding nerve on ultrasound  Paresthesia pain: none  Slow fractionated injection: yes  Hemodynamics: stable  Additional Notes  Immediately prior to procedure a \"time out\" was called to verify the correct patient, allergies, laterality, procedure and equipment. Time out performed with Anton Rodriguez. CRNA    Local Anesthetic: 0.5 %  Ropivacaine   Amount: 25 ml  in 5 ml increments after negative aspiration each time.         Reason for block: post-op pain management and at surgeon's request

## 2018-09-03 LAB
ABSOLUTE EOS #: <0.03 K/UL (ref 0–0.44)
ABSOLUTE IMMATURE GRANULOCYTE: 0.05 K/UL (ref 0–0.3)
ABSOLUTE LYMPH #: 2.78 K/UL (ref 1.1–3.7)
ABSOLUTE MONO #: 1.4 K/UL (ref 0.1–1.2)
ANION GAP SERPL CALCULATED.3IONS-SCNC: 16 MMOL/L (ref 9–17)
BASOPHILS # BLD: 0 % (ref 0–2)
BASOPHILS ABSOLUTE: 0.05 K/UL (ref 0–0.2)
BUN BLDV-MCNC: 7 MG/DL (ref 6–20)
BUN/CREAT BLD: ABNORMAL (ref 9–20)
CALCIUM SERPL-MCNC: 8.5 MG/DL (ref 8.6–10.4)
CHLORIDE BLD-SCNC: 98 MMOL/L (ref 98–107)
CO2: 22 MMOL/L (ref 20–31)
CREAT SERPL-MCNC: 0.59 MG/DL (ref 0.5–0.9)
DIFFERENTIAL TYPE: ABNORMAL
EOSINOPHILS RELATIVE PERCENT: 0 % (ref 1–4)
GFR AFRICAN AMERICAN: >60 ML/MIN
GFR NON-AFRICAN AMERICAN: >60 ML/MIN
GFR SERPL CREATININE-BSD FRML MDRD: ABNORMAL ML/MIN/{1.73_M2}
GFR SERPL CREATININE-BSD FRML MDRD: ABNORMAL ML/MIN/{1.73_M2}
GLUCOSE BLD-MCNC: 165 MG/DL (ref 70–99)
HCT VFR BLD CALC: 39.9 % (ref 36.3–47.1)
HEMOGLOBIN: 12.6 G/DL (ref 11.9–15.1)
IMMATURE GRANULOCYTES: 0 %
LYMPHOCYTES # BLD: 19 % (ref 24–43)
MCH RBC QN AUTO: 28.2 PG (ref 25.2–33.5)
MCHC RBC AUTO-ENTMCNC: 31.6 G/DL (ref 28.4–34.8)
MCV RBC AUTO: 89.3 FL (ref 82.6–102.9)
MONOCYTES # BLD: 10 % (ref 3–12)
NRBC AUTOMATED: 0 PER 100 WBC
PDW BLD-RTO: 12.5 % (ref 11.8–14.4)
PLATELET # BLD: 340 K/UL (ref 138–453)
PLATELET ESTIMATE: ABNORMAL
PMV BLD AUTO: 9.5 FL (ref 8.1–13.5)
POTASSIUM SERPL-SCNC: 4 MMOL/L (ref 3.7–5.3)
RBC # BLD: 4.47 M/UL (ref 3.95–5.11)
RBC # BLD: ABNORMAL 10*6/UL
SEG NEUTROPHILS: 71 % (ref 36–65)
SEGMENTED NEUTROPHILS ABSOLUTE COUNT: 10.34 K/UL (ref 1.5–8.1)
SODIUM BLD-SCNC: 136 MMOL/L (ref 135–144)
VITAMIN D 25-HYDROXY: 16.1 NG/ML (ref 30–100)
WBC # BLD: 14.6 K/UL (ref 3.5–11.3)
WBC # BLD: ABNORMAL 10*3/UL

## 2018-09-03 PROCEDURE — 97116 GAIT TRAINING THERAPY: CPT

## 2018-09-03 PROCEDURE — 2580000003 HC RX 258: Performed by: STUDENT IN AN ORGANIZED HEALTH CARE EDUCATION/TRAINING PROGRAM

## 2018-09-03 PROCEDURE — 80048 BASIC METABOLIC PNL TOTAL CA: CPT

## 2018-09-03 PROCEDURE — 85025 COMPLETE CBC W/AUTO DIFF WBC: CPT

## 2018-09-03 PROCEDURE — G8979 MOBILITY GOAL STATUS: HCPCS

## 2018-09-03 PROCEDURE — 99232 SBSQ HOSP IP/OBS MODERATE 35: CPT | Performed by: INTERNAL MEDICINE

## 2018-09-03 PROCEDURE — 99253 IP/OBS CNSLTJ NEW/EST LOW 45: CPT | Performed by: PHYSICAL MEDICINE & REHABILITATION

## 2018-09-03 PROCEDURE — 97163 PT EVAL HIGH COMPLEX 45 MIN: CPT

## 2018-09-03 PROCEDURE — 1200000000 HC SEMI PRIVATE

## 2018-09-03 PROCEDURE — 6360000002 HC RX W HCPCS: Performed by: STUDENT IN AN ORGANIZED HEALTH CARE EDUCATION/TRAINING PROGRAM

## 2018-09-03 PROCEDURE — 97530 THERAPEUTIC ACTIVITIES: CPT

## 2018-09-03 PROCEDURE — 6370000000 HC RX 637 (ALT 250 FOR IP): Performed by: STUDENT IN AN ORGANIZED HEALTH CARE EDUCATION/TRAINING PROGRAM

## 2018-09-03 PROCEDURE — 2580000003 HC RX 258: Performed by: EMERGENCY MEDICINE

## 2018-09-03 PROCEDURE — G8978 MOBILITY CURRENT STATUS: HCPCS

## 2018-09-03 PROCEDURE — 36415 COLL VENOUS BLD VENIPUNCTURE: CPT

## 2018-09-03 PROCEDURE — 6360000002 HC RX W HCPCS: Performed by: EMERGENCY MEDICINE

## 2018-09-03 RX ORDER — OXYCODONE HYDROCHLORIDE AND ACETAMINOPHEN 5; 325 MG/1; MG/1
1 TABLET ORAL EVERY 4 HOURS PRN
Qty: 42 TABLET | Refills: 0 | Status: SHIPPED | OUTPATIENT
Start: 2018-09-03 | End: 2018-09-10

## 2018-09-03 RX ORDER — ERGOCALCIFEROL 1.25 MG/1
50000 CAPSULE ORAL WEEKLY
Qty: 8 CAPSULE | Refills: 0 | Status: SHIPPED | OUTPATIENT
Start: 2018-09-03 | End: 2022-03-15

## 2018-09-03 RX ORDER — DOCUSATE SODIUM 100 MG/1
100 CAPSULE, LIQUID FILLED ORAL 2 TIMES DAILY PRN
Qty: 30 CAPSULE | Refills: 0 | Status: SHIPPED | OUTPATIENT
Start: 2018-09-03 | End: 2022-03-15

## 2018-09-03 RX ADMIN — OXYCODONE HYDROCHLORIDE 10 MG: 5 TABLET ORAL at 22:31

## 2018-09-03 RX ADMIN — DEXTROSE MONOHYDRATE 2 G: 50 INJECTION, SOLUTION INTRAVENOUS at 00:26

## 2018-09-03 RX ADMIN — ENOXAPARIN SODIUM 40 MG: 40 INJECTION SUBCUTANEOUS at 08:54

## 2018-09-03 RX ADMIN — OXYCODONE HYDROCHLORIDE 10 MG: 5 TABLET ORAL at 06:28

## 2018-09-03 RX ADMIN — OXYCODONE HYDROCHLORIDE 10 MG: 5 TABLET ORAL at 17:16

## 2018-09-03 RX ADMIN — Medication 10 ML: at 08:54

## 2018-09-03 RX ADMIN — MORPHINE SULFATE 4 MG: 4 INJECTION INTRAVENOUS at 00:26

## 2018-09-03 RX ADMIN — OXYCODONE HYDROCHLORIDE 10 MG: 5 TABLET ORAL at 10:58

## 2018-09-03 RX ADMIN — Medication 10 ML: at 20:57

## 2018-09-03 ASSESSMENT — PAIN DESCRIPTION - PAIN TYPE: TYPE: ACUTE PAIN;SURGICAL PAIN

## 2018-09-03 ASSESSMENT — PAIN SCALES - GENERAL
PAINLEVEL_OUTOF10: 10
PAINLEVEL_OUTOF10: 7
PAINLEVEL_OUTOF10: 10
PAINLEVEL_OUTOF10: 8
PAINLEVEL_OUTOF10: 10
PAINLEVEL_OUTOF10: 7

## 2018-09-03 NOTE — OP NOTE
right thigh. Both extremities were prepped and  draped out in sterile manner. We had 2 teams working simultaneously. Beginning on the arm, after a standard time-out, the arm was exsanguinated  with an Esmarch bandage, and the tourniquet was inflated to 250 mmHg. A  standard volar approach to the distal radius was made with a 15-blade along  the FCR tendon. We sharply dissected right down onto the tendon and  retracted the tendon radially. I incised through the floor of the FCR  sheath, and then with the finger, just bluntly dissected down to bone. The  pronator quadratus was incised along its radial margin with a Bovie and  with a Key elevator elevated from radial to ulnar. The self-retaining  retractor was placed. The fracture was very distal, so the distal pieces  were thin. There was a comminuted piece of volar cortex along the ulnar  side. We cleaned everything up a little bit, reduced the fracture. I used  the [employer] variable-angle distal radius plates, selected a plate, and  placed it along the volar cortex. We secured it with a couple of K-wires. C-arm was brought in. We adjusted the plate slightly until we were happy  with the position. We started filling first, from proximal, the shaft  screws. All 3 of those were placed and then we started filling the distal  ones using predominantly buttress posts rather than screws. There was one  screw placed in one distal hole. As we gained fixation, the K-wires were  removed. Final AP and lateral and elevated oblique films were obtained. I  was happy with the fixation. The 2 screws in the lunate fossa were really  close to the joint. I do not think they penetrated, but I did back him  out, re-drilled, and just re-directed them so that there was no question  that they were out of the joint. We were capturing the distal fragments  with our screws. She does have a little volar ulnar piece.   I think that  was a loose piece of just cortical bone
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
## 2018-09-03 NOTE — PROGRESS NOTES
OBS/CDU   RESIDENT NOTE      Patients PCP is:  . None (Inactive)        SUBJECTIVE      No acute events overnight. Has been able to tolerate a full diet without nausea or vomiting. The patient is urinating on his own and is passing flatus. Denies fever, chills, nausea, vomiting, chest pain, shortness of breath, abdominal pain, focal weakness, numbness, tingling, urinary/bowel symptoms, vision changes, visual hallucinations, or headache. PHYSICAL EXAM      General: . Patient sleeping when entered room, but easily arousable. NAD, AO X 3  Heent: no scleral icterus or conjunctival injection. Neck: SUPPLE, NO JVD  Cardiovascular: RRR, S1S2  Pulmonary: CTAB, NO SOB  Abdomen: SOFT, NTTP, ND, +BS  Extremities: splints in place over the RUE and RLE. Neuro / Psych: NO NUMBNESS OR TINGLING, MENTATION AT BASELINE. Patient able to wiggle fingers and toes on right. Sensation intact. PERTINENT TEST /EXAMS      I have reviewed all available laboratory results. MEDICATIONS CURRENT     oxyCODONE (ROXICODONE) immediate release tablet 5 mg Q4H PRN   Or    oxyCODONE (ROXICODONE) immediate release tablet 10 mg Q4H PRN   sodium chloride flush 0.9 % injection 10 mL 2 times per day   sodium chloride flush 0.9 % injection 10 mL PRN   enoxaparin (LOVENOX) injection 40 mg Daily   morphine (PF) injection 4 mg Q3H PRN   hydrALAZINE (APRESOLINE) injection 5 mg Q6H PRN     All medication charted and reviewed. CONSULTS      IP CONSULT TO ORTHOPEDIC SURGERY  IP CONSULT TO INTERNAL MEDICINE    ASSESSMENT/PLAN       Royce Gunderson is a 43 y.o. female who presents with:    1. Acute onset of right forearm and right lower leg pain, secondary to right distal radius/ulnar fracture and right bimalleolar fracture:  -patient s/p ORIF of fractures  -PO Roxicodone for pain.   -Prophylactic IV Ancef   -SQ lovenox for DVT prophylaxis  -PT/OT evaluated patient and recommend inpatient rehab or SNF placement.    -PMR consulted and recommends SNF
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
## 2018-09-04 VITALS
HEIGHT: 64 IN | DIASTOLIC BLOOD PRESSURE: 93 MMHG | OXYGEN SATURATION: 96 % | SYSTOLIC BLOOD PRESSURE: 144 MMHG | BODY MASS INDEX: 32.44 KG/M2 | TEMPERATURE: 98.8 F | RESPIRATION RATE: 16 BRPM | HEART RATE: 80 BPM | WEIGHT: 190 LBS

## 2018-09-04 LAB
ABSOLUTE EOS #: 0.05 K/UL (ref 0–0.44)
ABSOLUTE IMMATURE GRANULOCYTE: 0.06 K/UL (ref 0–0.3)
ABSOLUTE LYMPH #: 2.71 K/UL (ref 1.1–3.7)
ABSOLUTE MONO #: 1.42 K/UL (ref 0.1–1.2)
ANION GAP SERPL CALCULATED.3IONS-SCNC: 11 MMOL/L (ref 9–17)
BASOPHILS # BLD: 0 % (ref 0–2)
BASOPHILS ABSOLUTE: 0.04 K/UL (ref 0–0.2)
BUN BLDV-MCNC: 5 MG/DL (ref 6–20)
BUN/CREAT BLD: ABNORMAL (ref 9–20)
CALCIUM SERPL-MCNC: 8.4 MG/DL (ref 8.6–10.4)
CHLORIDE BLD-SCNC: 98 MMOL/L (ref 98–107)
CO2: 26 MMOL/L (ref 20–31)
CREAT SERPL-MCNC: 0.48 MG/DL (ref 0.5–0.9)
DIFFERENTIAL TYPE: ABNORMAL
EOSINOPHILS RELATIVE PERCENT: 0 % (ref 1–4)
GFR AFRICAN AMERICAN: >60 ML/MIN
GFR NON-AFRICAN AMERICAN: >60 ML/MIN
GFR SERPL CREATININE-BSD FRML MDRD: ABNORMAL ML/MIN/{1.73_M2}
GFR SERPL CREATININE-BSD FRML MDRD: ABNORMAL ML/MIN/{1.73_M2}
GLUCOSE BLD-MCNC: 126 MG/DL (ref 70–99)
HCT VFR BLD CALC: 41.4 % (ref 36.3–47.1)
HEMOGLOBIN: 12.8 G/DL (ref 11.9–15.1)
IMMATURE GRANULOCYTES: 0 %
LYMPHOCYTES # BLD: 20 % (ref 24–43)
MCH RBC QN AUTO: 27.6 PG (ref 25.2–33.5)
MCHC RBC AUTO-ENTMCNC: 30.9 G/DL (ref 28.4–34.8)
MCV RBC AUTO: 89.4 FL (ref 82.6–102.9)
MONOCYTES # BLD: 10 % (ref 3–12)
NRBC AUTOMATED: 0 PER 100 WBC
PDW BLD-RTO: 12.8 % (ref 11.8–14.4)
PLATELET # BLD: 340 K/UL (ref 138–453)
PLATELET ESTIMATE: ABNORMAL
PMV BLD AUTO: 9.7 FL (ref 8.1–13.5)
POTASSIUM SERPL-SCNC: 3.9 MMOL/L (ref 3.7–5.3)
RBC # BLD: 4.63 M/UL (ref 3.95–5.11)
RBC # BLD: ABNORMAL 10*6/UL
SEG NEUTROPHILS: 70 % (ref 36–65)
SEGMENTED NEUTROPHILS ABSOLUTE COUNT: 9.51 K/UL (ref 1.5–8.1)
SODIUM BLD-SCNC: 135 MMOL/L (ref 135–144)
WBC # BLD: 13.8 K/UL (ref 3.5–11.3)
WBC # BLD: ABNORMAL 10*3/UL

## 2018-09-04 PROCEDURE — G8987 SELF CARE CURRENT STATUS: HCPCS

## 2018-09-04 PROCEDURE — 97166 OT EVAL MOD COMPLEX 45 MIN: CPT

## 2018-09-04 PROCEDURE — 97535 SELF CARE MNGMENT TRAINING: CPT

## 2018-09-04 PROCEDURE — 6370000000 HC RX 637 (ALT 250 FOR IP): Performed by: STUDENT IN AN ORGANIZED HEALTH CARE EDUCATION/TRAINING PROGRAM

## 2018-09-04 PROCEDURE — 6360000002 HC RX W HCPCS: Performed by: EMERGENCY MEDICINE

## 2018-09-04 PROCEDURE — G8988 SELF CARE GOAL STATUS: HCPCS

## 2018-09-04 PROCEDURE — 97116 GAIT TRAINING THERAPY: CPT

## 2018-09-04 PROCEDURE — 80048 BASIC METABOLIC PNL TOTAL CA: CPT

## 2018-09-04 PROCEDURE — 85025 COMPLETE CBC W/AUTO DIFF WBC: CPT

## 2018-09-04 PROCEDURE — 99232 SBSQ HOSP IP/OBS MODERATE 35: CPT | Performed by: INTERNAL MEDICINE

## 2018-09-04 PROCEDURE — 2580000003 HC RX 258: Performed by: EMERGENCY MEDICINE

## 2018-09-04 PROCEDURE — G0108 DIAB MANAGE TRN  PER INDIV: HCPCS

## 2018-09-04 PROCEDURE — 36415 COLL VENOUS BLD VENIPUNCTURE: CPT

## 2018-09-04 RX ORDER — ERGOCALCIFEROL 1.25 MG/1
50000 CAPSULE ORAL WEEKLY
Qty: 8 CAPSULE | Refills: 0 | Status: SHIPPED | OUTPATIENT
Start: 2018-09-04 | End: 2022-03-15

## 2018-09-04 RX ORDER — NICOTINE 21 MG/24HR
1 PATCH, TRANSDERMAL 24 HOURS TRANSDERMAL DAILY
Status: DISCONTINUED | OUTPATIENT
Start: 2018-09-04 | End: 2018-09-04 | Stop reason: HOSPADM

## 2018-09-04 RX ADMIN — Medication 10 ML: at 08:28

## 2018-09-04 RX ADMIN — OXYCODONE HYDROCHLORIDE 10 MG: 5 TABLET ORAL at 07:37

## 2018-09-04 RX ADMIN — ENOXAPARIN SODIUM 40 MG: 40 INJECTION SUBCUTANEOUS at 08:28

## 2018-09-04 RX ADMIN — OXYCODONE HYDROCHLORIDE 10 MG: 5 TABLET ORAL at 19:07

## 2018-09-04 RX ADMIN — OXYCODONE HYDROCHLORIDE 10 MG: 5 TABLET ORAL at 11:32

## 2018-09-04 RX ADMIN — METFORMIN HYDROCHLORIDE 500 MG: 500 TABLET ORAL at 08:28

## 2018-09-04 RX ADMIN — OXYCODONE HYDROCHLORIDE 10 MG: 5 TABLET ORAL at 15:20

## 2018-09-04 RX ADMIN — OXYCODONE HYDROCHLORIDE 10 MG: 5 TABLET ORAL at 03:28

## 2018-09-04 ASSESSMENT — PAIN SCALES - GENERAL
PAINLEVEL_OUTOF10: 8
PAINLEVEL_OUTOF10: 6
PAINLEVEL_OUTOF10: 7
PAINLEVEL_OUTOF10: 9
PAINLEVEL_OUTOF10: 4
PAINLEVEL_OUTOF10: 7
PAINLEVEL_OUTOF10: 2
PAINLEVEL_OUTOF10: 4
PAINLEVEL_OUTOF10: 6

## 2018-09-04 ASSESSMENT — ENCOUNTER SYMPTOMS
ABDOMINAL PAIN: 0
VOMITING: 0
SHORTNESS OF BREATH: 0
COUGH: 0
NAUSEA: 0
BACK PAIN: 0

## 2018-09-04 ASSESSMENT — PAIN DESCRIPTION - LOCATION
LOCATION: ANKLE;WRIST
LOCATION: ANKLE;WRIST

## 2018-09-04 ASSESSMENT — PAIN DESCRIPTION - ORIENTATION
ORIENTATION: RIGHT
ORIENTATION: RIGHT

## 2018-09-04 ASSESSMENT — PAIN DESCRIPTION - DESCRIPTORS: DESCRIPTORS: ACHING;DISCOMFORT;SHARP

## 2018-09-04 ASSESSMENT — PAIN DESCRIPTION - FREQUENCY: FREQUENCY: CONTINUOUS

## 2018-09-04 ASSESSMENT — PAIN DESCRIPTION - PROGRESSION: CLINICAL_PROGRESSION: NOT CHANGED

## 2018-09-04 ASSESSMENT — PAIN DESCRIPTION - PAIN TYPE
TYPE: ACUTE PAIN;SURGICAL PAIN
TYPE: ACUTE PAIN;SURGICAL PAIN

## 2018-09-04 ASSESSMENT — PAIN DESCRIPTION - ONSET: ONSET: ON-GOING

## 2018-09-04 NOTE — CARE COORDINATION
Transition planning, met with patient to discuss snf choice, [facility] provider list provided agreeable to referral to [facility]. Called [facility] admissions spoke to 1970 Cathleen Torres will review case  17:00 dc order in, sarah Yang doing onsite can accept today.  Ps dr Lottie Wakefield ok to go, will request [facility]  @ 7   17:45 called myron elizondo spoke to [address] Isela said I had to call [facility] for pa. called [facility] will call me back with confirmed  time  17:48 received call from [facility] with confirmed  time 7:30-8;00

## 2018-09-04 NOTE — PROGRESS NOTES
[facility]  Internal Medicine Residency Program  Inpatient Daily Progress Note  ______________________________________________________________________________    Patient: Danette Morrissey  YOB: 1980   MRN: [id number]    Acct: [de-identified]     Admit date: 9/1/2018  Today's date: 09/04/18  Number of days in the hospital: 3  Expected Discharge Date: 09/03/18    Admitting Diagnosis: Closed fracture distal radius and ulna, right, initial encounter    Subjective:   Pt seen and Chart reviewed. No significant overnight issues.   Tolerating metformin     Objective:   Vital Sign:  BP (!) 150/94   Pulse 80   Temp 98 °F (36.7 °C) (Oral)   Resp 18   Ht 5' 4\" (1.626 m)   Wt 190 lb (86.2 kg)   SpO2 99%   BMI 32.61 kg/m²       Physical Exam:  General appearance:   alert, well appearing, and in no distress  Mental Status: alert, oriented to person, place, and time  Neurologic:  alert, oriented, normal speech, no focal findings or movement disorder noted  Lungs:  clear to auscultation, no wheezes, rales or rhonchi, symmetric air entry  Heart[de-identified] normal rate, regular rhythm, normal S1, S2, no murmurs, rubs, clicks or gallops  Abdomen:  soft, nontender, nondistended, no masses or organomegaly  Extremities: peripheral pulses normal, no pedal edema, no clubbing or cyanosis   Skin: normal coloration and turgor, no rashes, no suspicious skin lesions noted    Medications:  Scheduled Medications   metFORMIN  500 mg Oral Daily with breakfast    sodium chloride flush  10 mL Intravenous 2 times per day    enoxaparin  40 mg Subcutaneous Daily       PRN Medications    oxyCODONE 5 mg Q4H PRN   Or     oxyCODONE 10 mg Q4H PRN   sodium chloride flush 10 mL PRN   morphine 4 mg Q3H PRN   hydrALAZINE 5 mg Q6H PRN       Diagnostic Labs and Imaging:  CBC:  Recent Labs      09/01/18   1423  09/03/18   0929  09/04/18   0619   WBC  8.4  14.6*  13.8*   HGB  12.8  12.6  12.8   PLT  See Reflexed
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
## 2018-09-04 NOTE — PROGRESS NOTES
in Pain: Yes  Pain Assessment  Pain Assessment: 0-10  Pain Level: 9  Pain Type: Acute pain;Surgical pain  Pain Location: Ankle; Wrist  Pain Orientation: Right  Pain Descriptors: Aching;Discomfort; Sharp  Pain Frequency: Continuous  Pain Onset: On-going  Clinical Progression: Not changed  Pain Intervention(s): Ambulation/Increased activity; Therapeutic presence;Distraction  Response to Pain Intervention: Patient Satisfied  Vital Signs  Patient Currently in Pain: Yes       Orientation  Orientation  Overall Orientation Status: Within Functional Limits  Objective   Bed mobility  Rolling to Right: Contact guard assistance  Supine to Sit: Minimal assistance  Sit to Supine: Minimal assistance  Scooting: Contact guard assistance  Comment: vc's for maintaining NWBing R UE/LE with fair return demo  Transfers  Sit to Stand: Minimal Assistance  Stand to sit: Minimal Assistance  Comment: 3 trials STS requiring Gini   Ambulation  Ambulation?: Yes  Ambulation 1  Surface: level tile  Device: Platform Walker right  Assistance: Minimal assistance  Quality of Gait: 3 point gait pattern with platform walker and MIN A for propulsion. able to maintain NWB RUE and RLE (pt able to bear weight through R elbow)  Distance: 5ft forward and back  Comments: pt is unsteady throughout heavily reliant of platform walker for support with multiple partial LOB requiring MIN A to recover  Stairs/Curb  Stairs?: No     Balance  Posture: Good  Sitting - Static: Fair;+  Sitting - Dynamic: Fair;+  Standing - Static: Fair;-  Standing - Dynamic: Fair;-  Comments: standing balance assessed with platform walker  Exercises  Comments: pt deferred d/t pain and fatigue, pt self limiting d/t report of increased pain and fatigue. Pt perseverating on \"wanting a cigarette\", difficult to redirect. Assessment   Body structures, Functions, Activity limitations: Decreased functional mobility ; Decreased ADL status; Decreased balance;Decreased ROM; Decreased
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
## 2018-09-10 ENCOUNTER — HOSPITAL ENCOUNTER (EMERGENCY)
Age: 38
Discharge: NURSING, REHAB OR LONG-TERM CARE | End: 2018-09-10
Attending: EMERGENCY MEDICINE
Payer: MEDICAID

## 2018-09-10 VITALS
WEIGHT: 190 LBS | HEART RATE: 78 BPM | DIASTOLIC BLOOD PRESSURE: 76 MMHG | OXYGEN SATURATION: 100 % | HEIGHT: 64 IN | SYSTOLIC BLOOD PRESSURE: 113 MMHG | RESPIRATION RATE: 18 BRPM | BODY MASS INDEX: 32.44 KG/M2 | TEMPERATURE: 98.1 F

## 2018-09-10 DIAGNOSIS — Z98.890 STATUS POST OPEN REDUCTION WITH INTERNAL FIXATION (ORIF) OF FRACTURE OF ANKLE: Primary | ICD-10-CM

## 2018-09-10 DIAGNOSIS — S52.501A CLOSED FRACTURE DISTAL RADIUS AND ULNA, RIGHT, INITIAL ENCOUNTER: ICD-10-CM

## 2018-09-10 DIAGNOSIS — Z87.81 STATUS POST OPEN REDUCTION WITH INTERNAL FIXATION (ORIF) OF FRACTURE OF ANKLE: Primary | ICD-10-CM

## 2018-09-10 DIAGNOSIS — S52.601A CLOSED FRACTURE DISTAL RADIUS AND ULNA, RIGHT, INITIAL ENCOUNTER: ICD-10-CM

## 2018-09-10 PROCEDURE — 6370000000 HC RX 637 (ALT 250 FOR IP): Performed by: STUDENT IN AN ORGANIZED HEALTH CARE EDUCATION/TRAINING PROGRAM

## 2018-09-10 PROCEDURE — 97161 PT EVAL LOW COMPLEX 20 MIN: CPT

## 2018-09-10 PROCEDURE — G8988 SELF CARE GOAL STATUS: HCPCS

## 2018-09-10 PROCEDURE — 97535 SELF CARE MNGMENT TRAINING: CPT

## 2018-09-10 PROCEDURE — G8978 MOBILITY CURRENT STATUS: HCPCS

## 2018-09-10 PROCEDURE — 97165 OT EVAL LOW COMPLEX 30 MIN: CPT

## 2018-09-10 PROCEDURE — G8979 MOBILITY GOAL STATUS: HCPCS

## 2018-09-10 PROCEDURE — 99283 EMERGENCY DEPT VISIT LOW MDM: CPT

## 2018-09-10 PROCEDURE — G8987 SELF CARE CURRENT STATUS: HCPCS

## 2018-09-10 PROCEDURE — 97116 GAIT TRAINING THERAPY: CPT

## 2018-09-10 RX ORDER — HYDROCODONE BITARTRATE AND ACETAMINOPHEN 5; 325 MG/1; MG/1
1 TABLET ORAL ONCE
Status: COMPLETED | OUTPATIENT
Start: 2018-09-10 | End: 2018-09-10

## 2018-09-10 RX ORDER — HYDROCODONE BITARTRATE AND ACETAMINOPHEN 5; 325 MG/1; MG/1
1 TABLET ORAL EVERY 6 HOURS PRN
Qty: 10 TABLET | Refills: 0 | Status: SHIPPED | OUTPATIENT
Start: 2018-09-10 | End: 2018-09-13

## 2018-09-10 RX ADMIN — HYDROCODONE BITARTRATE AND ACETAMINOPHEN 1 TABLET: 5; 325 TABLET ORAL at 12:28

## 2018-09-10 RX ADMIN — HYDROCODONE BITARTRATE AND ACETAMINOPHEN 1 TABLET: 5; 325 TABLET ORAL at 15:40

## 2018-09-10 ASSESSMENT — PAIN SCALES - GENERAL
PAINLEVEL_OUTOF10: 7
PAINLEVEL_OUTOF10: 10
PAINLEVEL_OUTOF10: 10
PAINLEVEL_OUTOF10: 7
PAINLEVEL_OUTOF10: 7
PAINLEVEL_OUTOF10: 10

## 2018-09-10 ASSESSMENT — PAIN DESCRIPTION - LOCATION
LOCATION: WRIST
LOCATION: ANKLE
LOCATION: WRIST;ANKLE

## 2018-09-10 ASSESSMENT — PAIN DESCRIPTION - ORIENTATION
ORIENTATION: RIGHT

## 2018-09-10 ASSESSMENT — PAIN DESCRIPTION - PAIN TYPE
TYPE: ACUTE PAIN

## 2018-09-10 ASSESSMENT — ENCOUNTER SYMPTOMS
BACK PAIN: 0
SHORTNESS OF BREATH: 1
GASTROINTESTINAL NEGATIVE: 1
CHEST TIGHTNESS: 0

## 2018-09-10 ASSESSMENT — PAIN DESCRIPTION - FREQUENCY: FREQUENCY: CONTINUOUS

## 2018-09-10 ASSESSMENT — PAIN DESCRIPTION - DESCRIPTORS: DESCRIPTORS: CONSTANT;THROBBING;SHOOTING

## 2018-09-10 NOTE — CARE COORDINATION
[facility] spoke with pt/ot. SNF being recommended. [facility] also spoke with Janell. Pt is accepted at [facility] location. Pt to go to [facility].

## 2018-09-10 NOTE — CARE COORDINATION
Sw spoke with pt. About sending information to [facility] (SNF's). Pt was agreeable. [facility] faxed over facesheet to both facilities. Waiting for follow up.
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
## 2018-09-10 NOTE — PROGRESS NOTES
floor)  Home Access: Stairs to enter with rails  Entrance Stairs - Number of Steps: 4  Entrance Stairs - Rails: Right  Bathroom Toilet: Standard  Bathroom Equipment: Grab bars in shower  ADL Assistance: Independent  Homemaking Assistance: Independent  Ambulation Assistance: Independent  Transfer Assistance: Independent  Active : Yes  Mode of Transportation: Car  Occupation: Part time employment ([employer])  Type of occupation: [employer]       Objective   Vision: Within Functional Limits  Hearing: Within functional limits    Orientation  Overall Orientation Status: Within Functional Limits  Observation/Palpation  Posture: Fair  Observation: NWB R LE and R UE- platform walker for mob. Pt is forwared flexed and very guarded. cues to slow down for inc. safety. Pt reporting high pain levels at rest and with mob  Balance  Sitting Balance: Stand by assistance  Standing Balance: Stand by assistance  Standing Balance  Sit to stand: Contact guard assistance  Stand to sit: Contact guard assistance  Functional Mobility  Functional - Mobility Device: Platform walker  Assist Level: Minimal assistance  Functional Mobility Comments: CGA-min A for safety; cues to slow down and assist to position UE safely in platform. Pt does well keeping wt off R LE. Pt completed mob from bed in ER to doorway and back  ADL  Feeding: Setup (difficulty using non-dominant hand)  Grooming: Minimal assistance  UE Bathing: Moderate assistance  LE Bathing: Moderate assistance;Maximum assistance  UE Dressing: Moderate assistance  LE Dressing: Moderate assistance;Maximum assistance  Toileting:  Moderate assistance  Tone RUE  RUE Tone: Normotonic  Tone LUE  LUE Tone: Normotonic  Coordination  Movements Are Fluid And Coordinated: Yes     Bed mobility  Rolling to Left: Independent  Rolling to Right: Independent  Supine to Sit: Contact guard assistance  Sit to Supine: Contact guard assistance  Scooting: Contact guard assistance  Transfers  Sit to stand: Contact
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
## 2022-03-15 ENCOUNTER — HOSPITAL ENCOUNTER (EMERGENCY)
Age: 42
Discharge: LEFT BEFORE DISCHARGE | End: 2022-03-15

## 2022-03-15 ENCOUNTER — HOSPITAL ENCOUNTER (INPATIENT)
Age: 42
LOS: 6 days | Discharge: HOME | DRG: 751 | End: 2022-03-21
Attending: EMERGENCY MEDICINE | Admitting: PSYCHIATRY & NEUROLOGY
Payer: MEDICAID

## 2022-03-15 DIAGNOSIS — R45.851 SUICIDAL IDEATIONS: Primary | ICD-10-CM

## 2022-03-15 PROBLEM — F23 ACUTE PSYCHOSIS (HCC): Status: ACTIVE | Noted: 2022-03-15

## 2022-03-15 LAB
ABSOLUTE EOS #: 0 K/UL (ref 0–0.4)
ABSOLUTE LYMPH #: 3.37 K/UL (ref 1–4.8)
ABSOLUTE MONO #: 0.38 K/UL (ref 0.1–1.3)
ACETAMINOPHEN LEVEL: <5 UG/ML (ref 10–30)
AMPHETAMINE SCREEN URINE: POSITIVE
ANION GAP SERPL CALCULATED.3IONS-SCNC: 13 MMOL/L (ref 9–17)
ATYPICAL LYMPHOCYTE ABSOLUTE COUNT: 0.75 K/UL
ATYPICAL LYMPHOCYTES: 10 %
BACTERIA: ABNORMAL
BARBITURATE SCREEN URINE: NEGATIVE
BASOPHILS # BLD: 0 % (ref 0–2)
BASOPHILS ABSOLUTE: 0 K/UL (ref 0–0.2)
BENZODIAZEPINE SCREEN, URINE: NEGATIVE
BILIRUBIN URINE: NEGATIVE
BUN BLDV-MCNC: 10 MG/DL (ref 6–20)
CALCIUM SERPL-MCNC: 8.8 MG/DL (ref 8.6–10.4)
CANNABINOID SCREEN URINE: POSITIVE
CASTS UA: ABNORMAL /LPF
CHLORIDE BLD-SCNC: 101 MMOL/L (ref 98–107)
CO2: 24 MMOL/L (ref 20–31)
COCAINE METABOLITE, URINE: POSITIVE
COLOR: ABNORMAL
CREAT SERPL-MCNC: 0.63 MG/DL (ref 0.5–0.9)
EOSINOPHILS RELATIVE PERCENT: 0 % (ref 0–4)
EPITHELIAL CELLS UA: ABNORMAL /HPF
ETHANOL PERCENT: <0.01 %
ETHANOL: <10 MG/DL
GFR AFRICAN AMERICAN: >60 ML/MIN
GFR NON-AFRICAN AMERICAN: >60 ML/MIN
GFR SERPL CREATININE-BSD FRML MDRD: ABNORMAL ML/MIN/{1.73_M2}
GLUCOSE BLD-MCNC: 201 MG/DL (ref 70–99)
GLUCOSE URINE: NEGATIVE
HCG(URINE) PREGNANCY TEST: NEGATIVE
HCT VFR BLD CALC: 43.5 % (ref 36–46)
HEMOGLOBIN: 14.3 G/DL (ref 12–16)
KETONES, URINE: ABNORMAL
LEUKOCYTE ESTERASE, URINE: ABNORMAL
LYMPHOCYTES # BLD: 45 % (ref 24–44)
MCH RBC QN AUTO: 28 PG (ref 26–34)
MCHC RBC AUTO-ENTMCNC: 32.8 G/DL (ref 31–37)
MCV RBC AUTO: 85.5 FL (ref 80–100)
METHADONE SCREEN, URINE: NEGATIVE
MONOCYTES # BLD: 5 % (ref 1–7)
MORPHOLOGY: NORMAL
NITRITE, URINE: NEGATIVE
OPIATES, URINE: NEGATIVE
OXYCODONE SCREEN URINE: NEGATIVE
PDW BLD-RTO: 13.1 % (ref 11.5–14.9)
PH UA: 5.5 (ref 5–8)
PHENCYCLIDINE, URINE: NEGATIVE
PLATELET # BLD: 348 K/UL (ref 150–450)
PMV BLD AUTO: 7.6 FL (ref 6–12)
POTASSIUM SERPL-SCNC: 3.4 MMOL/L (ref 3.7–5.3)
PROTEIN UA: ABNORMAL
RBC # BLD: 5.09 M/UL (ref 4–5.2)
RBC UA: ABNORMAL /HPF
SALICYLATE LEVEL: <1 MG/DL (ref 3–10)
SEG NEUTROPHILS: 40 % (ref 36–66)
SEGMENTED NEUTROPHILS ABSOLUTE COUNT: 3 K/UL (ref 1.3–9.1)
SODIUM BLD-SCNC: 138 MMOL/L (ref 135–144)
SPECIFIC GRAVITY UA: 1.03 (ref 1–1.03)
TEST INFORMATION: ABNORMAL
TOXIC TRICYCLIC SC,BLOOD: ABNORMAL
TRICYCLIC ANTIDEP,URINE: NEGATIVE
TURBIDITY: ABNORMAL
URINE HGB: NEGATIVE
UROBILINOGEN, URINE: NORMAL
WBC # BLD: 7.5 K/UL (ref 3.5–11)
WBC UA: ABNORMAL /HPF

## 2022-03-15 PROCEDURE — 99282 EMERGENCY DEPT VISIT SF MDM: CPT

## 2022-03-15 PROCEDURE — 81025 URINE PREGNANCY TEST: CPT

## 2022-03-15 PROCEDURE — 1240000000 HC EMOTIONAL WELLNESS R&B

## 2022-03-15 PROCEDURE — 80179 DRUG ASSAY SALICYLATE: CPT

## 2022-03-15 PROCEDURE — 85025 COMPLETE CBC W/AUTO DIFF WBC: CPT

## 2022-03-15 PROCEDURE — 36415 COLL VENOUS BLD VENIPUNCTURE: CPT

## 2022-03-15 PROCEDURE — G0480 DRUG TEST DEF 1-7 CLASSES: HCPCS

## 2022-03-15 PROCEDURE — 80048 BASIC METABOLIC PNL TOTAL CA: CPT

## 2022-03-15 PROCEDURE — 87086 URINE CULTURE/COLONY COUNT: CPT

## 2022-03-15 PROCEDURE — 80307 DRUG TEST PRSMV CHEM ANLYZR: CPT

## 2022-03-15 PROCEDURE — 80143 DRUG ASSAY ACETAMINOPHEN: CPT

## 2022-03-15 PROCEDURE — 81001 URINALYSIS AUTO W/SCOPE: CPT

## 2022-03-15 ASSESSMENT — SLEEP AND FATIGUE QUESTIONNAIRES
DO YOU HAVE DIFFICULTY SLEEPING: YES
RESTFUL SLEEP: NO
DIFFICULTY STAYING ASLEEP: YES
DIFFICULTY FALLING ASLEEP: YES
SLEEP PATTERN: DIFFICULTY FALLING ASLEEP;RESTLESSNESS;DISTURBED/INTERRUPTED SLEEP
AVERAGE NUMBER OF SLEEP HOURS: 4
DO YOU USE A SLEEP AID: NO
DIFFICULTY ARISING: NO

## 2022-03-15 ASSESSMENT — PATIENT HEALTH QUESTIONNAIRE - PHQ9: SUM OF ALL RESPONSES TO PHQ QUESTIONS 1-9: 15

## 2022-03-15 ASSESSMENT — PAIN - FUNCTIONAL ASSESSMENT: PAIN_FUNCTIONAL_ASSESSMENT: 0-10

## 2022-03-15 ASSESSMENT — LIFESTYLE VARIABLES: HISTORY_ALCOHOL_USE: NO

## 2022-03-15 NOTE — ED NOTES
Per Latonya Carpenter, patient has been accepted by Dr. Colleen Lauren, but needs lab work completed prior to admission.

## 2022-03-15 NOTE — PROGRESS NOTES
Medication History completed:    New medications: None    Medications discontinued: metFORMIN 500 MG, vitamin D 79747 units, docusate sodium 100 MG     Other pertinent information: medications were confirmed with patient. Thank you,     Amada Schaeffer, PharmD Candidate 2022.

## 2022-03-15 NOTE — ED PROVIDER NOTES
[address] ED  eMERGENCY dEPARTMENT eNCOUnter      Pt Name: Isaias Castillo  MRN: [id number]  YOB: 1980  Date of evaluation: 3/15/22  PCP: . None (Inactive)    CHIEF COMPLAINT:   Chief Complaint   Patient presents with    Other     needs blood work for [facility] admission     HISTORY OF [address] Hatch is a 42 y.o. female who presents with chief complaint of auditory hallucinations and suicidal ideations. Voices are not command hallucinations. Patient states she has felt this way for 6 months. She was sent here by the [address] [place] [address]. She was sent here for medical clearance. She has no medical complaints. Denies any drug or alcohol use today. Symptoms are chronic. Symptoms are moderate per nothing make symptoms better or worse. Patient has no other complaints at this time. REVIEW OF SYSTEMS       Constitutional: Denies recent fever, chills. Neck: No neck pain. Respiratory: Denies recent shortness of breath. Cardiac:  Denies recent chest pain. GI: Denies any recent abdominal pain nausea or vomiting. : Denies dysuria. Musculoskeletal: Denies focal weakness. Neurologic: Denies headache or focal weakness. Skin:  Denies any rash. Psychiatric: Positive for auditory hallucinations and suicidal ideations. Denies homicidal ideations    Negative in 10 essential Systems except as mentioned above and in the HPI. PAST MEDICAL HISTORY   PMH:  has a past medical history of Anxiety, Bipolar 1 disorder ([address] and Depression. Surgical History:  has a past surgical history that includes open treatment radial shaft fracture (Right, 9/2/2018) and open treatment bimalleolar ankle fracture (Right, 9/2/2018). Social History:  reports that she has been smoking cigarettes. She has been smoking about 1.00 pack per day. She has never used smokeless tobacco. She reports current alcohol use. She reports current drug use. Drugs: Cocaine and Marijuana ([facility] Puna).   Family History: Noncontributory at this time  Psychiatric History: See PMH  Allergies:has No Known Allergies. PHYSICAL EXAM     INITIAL VITALS: BP: (!) 150/99  Pulse: 75  Resp: 19  Temp: 98.7 °F (37.1 °C) SpO2: 97 %     Constitutional:  Well developed, no acute distress   Eyes:  Pupils equal and readily reactive to light  HENT:  Atraumatic, external ears normal, nose normal, oropharynx moist. Neck- supple    Respiratory:  Clear to auscultation bilaterally with good air exchange  Cardiovascular:  RRR with normal S1 and S2  GI:  Soft, nondistended and nontender   Musculoskeletal:  No edema, no tenderness, no deformities  Integument:  No rash  Neurologic:  Alert & oriented x 4, no focal deficits noted   Psychiatric: Normal mood and affect      EMERGENCY DEPARTMENT COURSE     71-year-old female presents with several months of suicidal ideations and noncommand hallucinations. She is afebrile, nontoxic, hypertensive otherwise vital signs normal.  No acute distress. Has no medical complaints. She is medically cleared at this time for psychiatric evaluation likely admission. FINAL IMPRESSION     1. Suicidal ideations          DISPOSITION:  DISPOSITION Decision To Admit 03/15/2022 05:54:45 PM        PATIENT REFERRED TO:  No follow-up provider specified. DISCHARGE MEDICATIONS:  New Prescriptions    No medications on file       The care is provided during an unprecedented national emergency due to the novel coronavirus, COVID-19. (Please note that portions of this note were completed with a voice recognition program. Efforts were made to edit the dictations but occasionally words are mis-transcribed.  Whenever words are used in this note in any gender, they shall be construed as though they were used in the gender appropriate to the circumstances; and whenever words are used in this note in the singular or plural form, they shall be construed as though they were used in the form appropriate to the circumstances.)    Paras Dior DO  Attending Emergency Medicine Physician        Paras Dior DO  03/15/22 2578

## 2022-03-16 PROBLEM — F33.3 MAJOR DEPRESSIVE DISORDER, RECURRENT EPISODE, SEVERE, WITH PSYCHOSIS (HCC): Status: ACTIVE | Noted: 2022-03-16

## 2022-03-16 PROBLEM — F12.90 MARIJUANA USE: Status: ACTIVE | Noted: 2022-03-16

## 2022-03-16 PROBLEM — F14.90 COCAINE USE: Status: ACTIVE | Noted: 2022-03-16

## 2022-03-16 PROBLEM — F15.10 AMPHETAMINE ABUSE (HCC): Status: ACTIVE | Noted: 2022-03-16

## 2022-03-16 PROBLEM — F10.20 ALCOHOL USE DISORDER, SEVERE, DEPENDENCE (HCC): Status: ACTIVE | Noted: 2022-03-16

## 2022-03-16 PROCEDURE — 99223 1ST HOSP IP/OBS HIGH 75: CPT | Performed by: PSYCHIATRY & NEUROLOGY

## 2022-03-16 PROCEDURE — 6370000000 HC RX 637 (ALT 250 FOR IP): Performed by: PSYCHIATRY & NEUROLOGY

## 2022-03-16 PROCEDURE — APPSS180 APP SPLIT SHARED TIME > 60 MINUTES

## 2022-03-16 PROCEDURE — 6370000000 HC RX 637 (ALT 250 FOR IP): Performed by: INTERNAL MEDICINE

## 2022-03-16 PROCEDURE — 99223 1ST HOSP IP/OBS HIGH 75: CPT | Performed by: INTERNAL MEDICINE

## 2022-03-16 PROCEDURE — 1240000000 HC EMOTIONAL WELLNESS R&B

## 2022-03-16 RX ORDER — HYDROXYZINE 50 MG/1
50 TABLET, FILM COATED ORAL 3 TIMES DAILY PRN
Status: DISCONTINUED | OUTPATIENT
Start: 2022-03-16 | End: 2022-03-21 | Stop reason: HOSPADM

## 2022-03-16 RX ORDER — PALIPERIDONE 3 MG/1
3 TABLET, EXTENDED RELEASE ORAL DAILY
Status: DISCONTINUED | OUTPATIENT
Start: 2022-03-16 | End: 2022-03-21 | Stop reason: HOSPADM

## 2022-03-16 RX ORDER — LORAZEPAM 2 MG/ML
2 INJECTION INTRAMUSCULAR
Status: DISCONTINUED | OUTPATIENT
Start: 2022-03-16 | End: 2022-03-21 | Stop reason: HOSPADM

## 2022-03-16 RX ORDER — NICOTINE 21 MG/24HR
1 PATCH, TRANSDERMAL 24 HOURS TRANSDERMAL DAILY
Status: DISCONTINUED | OUTPATIENT
Start: 2022-03-16 | End: 2022-03-21 | Stop reason: HOSPADM

## 2022-03-16 RX ORDER — TRAZODONE HYDROCHLORIDE 50 MG/1
50 TABLET ORAL NIGHTLY PRN
Status: DISCONTINUED | OUTPATIENT
Start: 2022-03-16 | End: 2022-03-21 | Stop reason: HOSPADM

## 2022-03-16 RX ORDER — GAUZE BANDAGE 2" X 2"
100 BANDAGE TOPICAL DAILY
Status: DISCONTINUED | OUTPATIENT
Start: 2022-03-16 | End: 2022-03-21 | Stop reason: HOSPADM

## 2022-03-16 RX ORDER — IBUPROFEN 400 MG/1
400 TABLET ORAL EVERY 6 HOURS PRN
Status: DISCONTINUED | OUTPATIENT
Start: 2022-03-16 | End: 2022-03-21 | Stop reason: HOSPADM

## 2022-03-16 RX ORDER — LORAZEPAM 1 MG/1
1 TABLET ORAL
Status: DISCONTINUED | OUTPATIENT
Start: 2022-03-16 | End: 2022-03-21 | Stop reason: HOSPADM

## 2022-03-16 RX ORDER — LORAZEPAM 1 MG/1
4 TABLET ORAL
Status: DISCONTINUED | OUTPATIENT
Start: 2022-03-16 | End: 2022-03-21 | Stop reason: HOSPADM

## 2022-03-16 RX ORDER — LORAZEPAM 2 MG/ML
3 INJECTION INTRAMUSCULAR
Status: DISCONTINUED | OUTPATIENT
Start: 2022-03-16 | End: 2022-03-21 | Stop reason: HOSPADM

## 2022-03-16 RX ORDER — MAGNESIUM HYDROXIDE/ALUMINUM HYDROXICE/SIMETHICONE 120; 1200; 1200 MG/30ML; MG/30ML; MG/30ML
30 SUSPENSION ORAL EVERY 6 HOURS PRN
Status: DISCONTINUED | OUTPATIENT
Start: 2022-03-16 | End: 2022-03-21 | Stop reason: HOSPADM

## 2022-03-16 RX ORDER — CIPROFLOXACIN 500 MG/1
500 TABLET, FILM COATED ORAL EVERY 12 HOURS SCHEDULED
Status: COMPLETED | OUTPATIENT
Start: 2022-03-16 | End: 2022-03-19

## 2022-03-16 RX ORDER — LORAZEPAM 1 MG/1
2 TABLET ORAL
Status: DISCONTINUED | OUTPATIENT
Start: 2022-03-16 | End: 2022-03-21 | Stop reason: HOSPADM

## 2022-03-16 RX ORDER — ACETAMINOPHEN 325 MG/1
650 TABLET ORAL EVERY 4 HOURS PRN
Status: DISCONTINUED | OUTPATIENT
Start: 2022-03-16 | End: 2022-03-21 | Stop reason: HOSPADM

## 2022-03-16 RX ORDER — POLYETHYLENE GLYCOL 3350 17 G/17G
17 POWDER, FOR SOLUTION ORAL DAILY PRN
Status: DISCONTINUED | OUTPATIENT
Start: 2022-03-16 | End: 2022-03-21 | Stop reason: HOSPADM

## 2022-03-16 RX ORDER — LORAZEPAM 2 MG/ML
4 INJECTION INTRAMUSCULAR
Status: DISCONTINUED | OUTPATIENT
Start: 2022-03-16 | End: 2022-03-21 | Stop reason: HOSPADM

## 2022-03-16 RX ORDER — M-VIT,TX,IRON,MINS/CALC/FOLIC 27MG-0.4MG
1 TABLET ORAL DAILY
Status: DISCONTINUED | OUTPATIENT
Start: 2022-03-16 | End: 2022-03-21 | Stop reason: HOSPADM

## 2022-03-16 RX ORDER — LORAZEPAM 1 MG/1
3 TABLET ORAL
Status: DISCONTINUED | OUTPATIENT
Start: 2022-03-16 | End: 2022-03-21 | Stop reason: HOSPADM

## 2022-03-16 RX ORDER — LORAZEPAM 2 MG/ML
1 INJECTION INTRAMUSCULAR
Status: DISCONTINUED | OUTPATIENT
Start: 2022-03-16 | End: 2022-03-21 | Stop reason: HOSPADM

## 2022-03-16 RX ADMIN — CIPROFLOXACIN 500 MG: 500 TABLET, FILM COATED ORAL at 22:18

## 2022-03-16 RX ADMIN — MULTIPLE VITAMINS W/ MINERALS TAB 1 TABLET: TAB at 16:23

## 2022-03-16 RX ADMIN — PALIPERIDONE 3 MG: 3 TABLET, EXTENDED RELEASE ORAL at 16:23

## 2022-03-16 RX ADMIN — THIAMINE HCL TAB 100 MG 100 MG: 100 TAB at 16:23

## 2022-03-16 NOTE — PLAN OF CARE
Problem: Altered Mood, Depressive Behavior:  Goal: Able to verbalize and/or display a decrease in depressive symptoms  Description: Able to verbalize and/or display a decrease in depressive symptoms  3/16/2022 1244 by Jessika Davidson RN  Outcome: Ongoing  Note: Ms. Carolynn Snellen endorses depressive mood. She denies suicidal ideation at this time. She is isolative to her room for most of the shift, coming out for meals and needs. Problem: Altered Mood, Psychotic Behavior:  Goal: Able to verbalize decrease in frequency and intensity of hallucinations  Description: Able to verbalize decrease in frequency and intensity of hallucinations  3/16/2022 1244 by Jessika Davidson RN  Outcome: Ongoing  Note: Ms. Carolynn Snellen endorses auditory hallucinations, says she can not make out what they're saying. Problem: Tobacco Use:  Goal: Inpatient tobacco use cessation counseling participation  Description: Inpatient tobacco use cessation counseling participation  3/16/2022 1244 by Jessika Davidson RN  Outcome: Ongoing  Note: Ms. Carolynn Snellen declined her scheduled nicotine patch today.

## 2022-03-16 NOTE — H&P
[place] [address] Internal Medicine    CONSULTATION / HISTORY AND PHYSICAL EXAMINATION            Date:   3/16/2022  Patient name:  Too Bellamy  Date of admission:  3/15/2022  5:30 PM  MRN:   [id number]  Account:  [de-identified]  YOB: 1980  PCP:    . None (Inactive)  Room:   [address] [place] [address]  Code Status:    Full Code    Physician Requesting Consult: Willy España MD    Reason for Consult:  medical management    Chief Complaint:     Chief Complaint   Patient presents with    Other     needs blood work for [facility] admission   uti  History Obtained From:     Patient medical record nursing staff    History of Present Illness:     Urinary Tract Infection: Patient complains of dysuria She has had symptoms for 1 week. Patient also complains of none. Patient denies one. Patient does have a history of recurrent UTI. Patient does not have a history of pyelonephritis. Past Medical History:     Past Medical History:   Diagnosis Date    Anxiety     Bipolar 1 disorder ([facility] [address]     Depression         Past Surgical History:     Past Surgical History:   Procedure Laterality Date    OPEN TREATMENT BIMALLEOLAR ANKLE FRACTURE Right 9/2/2018    ANKLE OPEN REDUCTION INTERNAL FIXATION performed by Lisa Waldron MD at [address] [place] [address] Right 9/2/2018    RADIUS OPEN REDUCTION INTERNAL FIXATION performed by Lisa Waldron MD at [place] [address]        Medications Prior to Admission:     Prior to Admission medications    Not on File        Allergies:     Patient has no known allergies. Social History:     Tobacco:    reports that she has been smoking cigarettes. She has been smoking about 1.00 pack per day. She has never used smokeless tobacco.  Alcohol:      reports current alcohol use. Drug Use:  reports current drug use. Drugs: Cocaine and Marijuana ([facility] M Osgood). Family History:     No family history on file.     Review of Systems:     Positive and Negative as described in HPI. CONSTITUTIONAL:  negative for fevers, chills, sweats, fatigue, weight loss  HEENT:  negative for vision, hearing changes, runny nose, throat pain  RESPIRATORY:  negative for shortness of breath, cough, congestion, wheezing. CARDIOVASCULAR:  negative for chest pain, palpitations. GASTROINTESTINAL:  negative for nausea, vomiting, diarrhea, constipation, change in bowel habits, abdominal pain   GENITOURINARY:  Dysuria  INTEGUMENT:  negative for rash, skin lesions, easy bruising   HEMATOLOGIC/LYMPHATIC:  negative for swelling/edema   ALLERGIC/IMMUNOLOGIC:  negative for urticaria , itching  ENDOCRINE:  negative increase in drinking, increase in urination, hot or cold intolerance  MUSCULOSKELETAL:  negative joint pains, muscle aches, swelling of joints  NEUROLOGICAL:  negative for headaches, dizziness, lightheadedness, numbness, pain, tingling extremities    Physical Exam:     BP (!) 146/103   Pulse 97   Temp 96.9 °F (36.1 °C) (Temporal)   Resp 14   Ht 5' 4\" (1.626 m)   Wt 280 lb (127 kg)   SpO2 96%   BMI 48.06 kg/m²   Temp (24hrs), Av.9 °F (36.6 °C), Min:96.9 °F (36.1 °C), Max:98.7 °F (37.1 °C)    No results for input(s): POCGLU in the last 72 hours. No intake or output data in the 24 hours ending 22 1438    General Appearance:  alert, well appearing, and in no acute distress  Mental status: oriented to person, place, and time with normal affect  Head:  normocephalic, atraumatic. Eye: no icterus, redness, pupils equal and reactive, extraocular eye movements intact, conjunctiva clear  Ear: normal external ear, no discharge, hearing intact  Nose:  no drainage noted  Mouth: mucous membranes moist  Neck: supple, no carotid bruits, thyroid not palpable  Lungs: Bilateral equal air entry, clear to ausculation, no wheezing, rales or rhonchi, normal effort  Cardiovascular: normal rate, regular rhythm, no murmur, gallop, rub.   Abdomen: Soft, nontender, nondistended, normal bowel sounds, no hepatomegaly or splenomegaly  Neurologic: There are no new focal motor or sensory deficits, normal muscle tone and bulk, no abnormal sensation, normal speech, cranial nerves II through XII grossly intact  Skin: No gross lesions, rashes, bruising or bleeding on exposed skin area  Extremities:  peripheral pulses palpable, no pedal edema or calf pain with palpation      Investigations:      Laboratory Testing:  Recent Results (from the past 24 hour(s))   CBC with Auto Differential    Collection Time: 03/15/22  6:55 PM   Result Value Ref Range    WBC 7.5 3.5 - 11.0 k/uL    RBC 5.09 4.0 - 5.2 m/uL    Hemoglobin 14.3 12.0 - 16.0 g/dL    Hematocrit 43.5 36 - 46 %    MCV 85.5 80 - 100 fL    MCH 28.0 26 - 34 pg    MCHC 32.8 31 - 37 g/dL    RDW 13.1 11.5 - 14.9 %    Platelets 966 723 - 708 k/uL    MPV 7.6 6.0 - 12.0 fL    Seg Neutrophils 40 36 - 66 %    Lymphocytes 45 (H) 24 - 44 %    Atypical Lymphocytes 10 %    Monocytes 5 1 - 7 %    Eosinophils % 0 0 - 4 %    Basophils 0 0 - 2 %    Segs Absolute 3.00 1.3 - 9.1 k/uL    Absolute Lymph # 3.37 1.0 - 4.8 k/uL    Atypical Lymphocytes Absolute 0.75 k/uL    Absolute Mono # 0.38 0.1 - 1.3 k/uL    Absolute Eos # 0.00 0.0 - 0.4 k/uL    Basophils Absolute 0.00 0.0 - 0.2 k/uL    Morphology Normal    Basic Metabolic Panel    Collection Time: 03/15/22  6:55 PM   Result Value Ref Range    Glucose 201 (H) 70 - 99 mg/dL    BUN 10 6 - 20 mg/dL    CREATININE 0.63 0.50 - 0.90 mg/dL    Calcium 8.8 8.6 - 10.4 mg/dL    Sodium 138 135 - 144 mmol/L    Potassium 3.4 (L) 3.7 - 5.3 mmol/L    Chloride 101 98 - 107 mmol/L    CO2 24 20 - 31 mmol/L    Anion Gap 13 9 - 17 mmol/L    GFR Non-African American >60 >60 mL/min    GFR African American >60 >60 mL/min    GFR Comment         TOX SCR, BLD, ED    Collection Time: 03/15/22  6:55 PM   Result Value Ref Range    Acetaminophen Level <5 (L) 10 - 30 ug/mL    Ethanol <10 <10 mg/dL    Ethanol percent <1.487 %    Salicylate Lvl <1 (L) 3 - 10 mg/dL    Toxic Tricyclic Sc,Blood WRONG TEST ORDERED NEGATIVE   Urine Drug Screen    Collection Time: 03/15/22  6:55 PM   Result Value Ref Range    Amphetamine Screen, Ur POSITIVE (A) NEGATIVE    Barbiturate Screen, Ur NEGATIVE NEGATIVE    Benzodiazepine Screen, Urine NEGATIVE NEGATIVE    Cocaine Metabolite, Urine POSITIVE (A) NEGATIVE    Methadone Screen, Urine NEGATIVE NEGATIVE    Opiates, Urine NEGATIVE NEGATIVE    Phencyclidine, Urine NEGATIVE NEGATIVE    Cannabinoid Scrn, Ur POSITIVE (A) NEGATIVE    Oxycodone Screen, Ur NEGATIVE NEGATIVE    Test Information       Assay provides medical screening only. The absence of expected drug(s) and/or metabolite(s) may indicate diluted or adulterated urine, limitations of testing or timing of collection.    HCG, Pregnancy,Urine    Collection Time: 03/15/22  6:55 PM   Result Value Ref Range    HCG(Urine) Pregnancy Test NEGATIVE NEGATIVE   Urinalysis with Reflex to Culture    Collection Time: 03/15/22  6:55 PM    Specimen: Urine, clean catch   Result Value Ref Range    Color, UA Dark Yellow (A) Yellow    Turbidity UA Cloudy (A) Clear    Glucose, Ur NEGATIVE NEGATIVE    Bilirubin Urine NEGATIVE NEGATIVE    Ketones, Urine SMALL (A) NEGATIVE    Specific Gravity, UA 1.026 1.000 - 1.030    Urine Hgb NEGATIVE NEGATIVE    pH, UA 5.5 5.0 - 8.0    Protein, UA 1+ (A) NEGATIVE    Urobilinogen, Urine Normal Normal    Nitrite, Urine NEGATIVE NEGATIVE    Leukocyte Esterase, Urine SMALL (A) NEGATIVE   Drug screen, tricyclic    Collection Time: 03/15/22  6:55 PM   Result Value Ref Range    Tricyclic Antidep,Urine NEGATIVE NEGATIVE   Microscopic Urinalysis    Collection Time: 03/15/22  6:55 PM   Result Value Ref Range    WBC, UA 21 TO 50 /HPF    RBC, UA 0 TO 2 /HPF    Casts UA 3 to 5 /LPF    Epithelial Cells UA 10 TO 20 /HPF    Bacteria, UA MANY (A) None           Consultations:   IP CONSULT TO INTERNAL MEDICINE  IP CONSULT TO SOCIAL WORK  Assessment :      Primary Problem  Acute psychosis (HCC)    Active Hospital Problems    Diagnosis Date Noted    Acute psychosis ([facility] [address] [F23] 03/15/2022       Plan:     44-year-old patient morbidly obese class III or higher BMI 48.06 weighs 280 pounds  Complains of dysuria increased frequency lower abdominal pain but no flank pain no fever chills no nausea vomiting  Urinalysis suggestive of UTI cultures pending  We will start oral Cipro 500 twice a day awaiting cultures  No history of ESBL UTI        Bernardo Tejeda MD  3/16/2022  2:38 PM    Copy sent to Dr. Ruth Solis (Inactive)    Please note that this chart was generated using voice recognition Dragon dictation software. Although every effort was made to ensure the accuracy of this automated transcription, some errors in transcription may have occurred.
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
## 2022-03-16 NOTE — CARE COORDINATION
Writer unable to complete biopsychosocial assessment at this time as pt is sleeping unable to wake up and there if not enough collateral information to complete assessment.

## 2022-03-16 NOTE — GROUP NOTE
Group Therapy Note    Date: 3/16/2022    Group Start Time: 1110  Group End Time: 1435  Group Topic: Cognitive Skills    KEMI BHI CARYN Rizvi, CTRS        Group Therapy Note    Attendees: 11/19       Pt did not participate in Cognitive Skills Group at 11:10am when encouraged by RT due to pt resting in room. Pt was offered talk time as an alternative to group but declined.          Discipline Responsible: Psychoeducational Specialist        Signature:  Unruly Cisneros

## 2022-03-16 NOTE — PROGRESS NOTES
Behavioral Services  Medicare Certification Upon Admission    I certify that this patient's inpatient psychiatric hospital admission is medically necessary for:    [x] (1) Treatment which could reasonably be expected to improve this patient's condition,       [x] (2) Or for diagnostic study;     AND     [x](2) The inpatient psychiatric services are provided while the individual is under the care of a physician and are included in the individualized plan of care.     Estimated length of stay/service 3-9 days    Plan for post-hospital care -outpatient care    Electronically signed by Wen Bell MD on 3/16/2022 at 7:14 PM

## 2022-03-16 NOTE — GROUP NOTE
Group Therapy Note    Date: 3/16/2022    Group Start Time: 1015  Group End Time: 2582  Group Topic: Psychoeducation    [facility] D    LAVERNE Lewis LSW        Group Therapy Note    patient refused to attend psychoeducational group at 10:15am after encouragement from staff.          Signature:  LAVERNE Lewis LSW

## 2022-03-16 NOTE — PLAN OF CARE
[facility]  Initial Interdisciplinary Treatment Plan NOTE      Original treatment plan Date & Time: 3/16/2022                         842am    Admission Type:  Admission Type: Voluntary    Reason for admission:   Reason for Admission: suicidal thoughts    Estimated Length of Stay:  5-7days  Estimated Discharge Date: to be determined by physician    PATIENT STRENGTHS:  Patient Strengths:Strengths: No significant Physical Illness  Patient Strengths and Limitations:Limitations: Hopeless about future,Difficulty problem solving/relies on others to help solve problems,Inappropriate/potentially harmful leisure interests  Addictive Behavior: Addictive Behavior  In the past 3 months, have you felt or has someone told you that you have a problem with:  : None  Do you have a history of Chemical Use?: No  Do you have a history of Alcohol Use?: No  Do you have a history of Street Drug Abuse?: Yes  Histroy of Prescripton Drug Abuse?: No  Medical Problems:  Past Medical History:   Diagnosis Date    Anxiety     Bipolar 1 disorder ([facility] [address]     Depression      Status EXAM:Status and Exam  Normal: No  Facial Expression: Sad,Worried,Flat  Affect: Blunt  Level of Consciousness: Alert  Mood:Normal: No  Mood: Depressed,Anxious,Irritable  Motor Activity:Normal: Yes  Interview Behavior: Cooperative  Preception: [place] to Person,[place] to Time,[place] to Place,[place] to Situation  Attention:Normal: No  Attention: Distractible  Thought Processes: Circumstantial  Thought Content:Normal: No  Thought Content: Preoccupations  Hallucinations:  Auditory (Comment)  Delusions: No  Memory:Normal: No  Memory: Poor Recent,Poor Remote  Insight and Judgment: No  Insight and Judgment: Poor Judgment,Poor Insight  Present Suicidal Ideation: Yes  Present Homicidal Ideation: No    EDUCATION:   Learner Progress Toward Treatment Goals: reviewed group plans and strategies for care    Method:group therapy, medication compliance, individualized assessments and care planning    Outcome: needs reinforcement    PATIENT GOALS: to be discussed with patient within 72 hours    PLAN/TREATMENT RECOMMENDATIONS:     continue group therapy , medications compliance, goal setting, individualized assessments and care, continue to monitor pt on unit      SHORT-TERM GOALS:   Time frame for Short-Term Goals: 5-7 days    LONG-TERM GOALS:  Time frame for Long-Term Goals: 6 months  Members Present in Team Meeting: See Signature Sheet    Dawson Cueva

## 2022-03-17 LAB
CULTURE: NORMAL
SPECIMEN DESCRIPTION: NORMAL

## 2022-03-17 PROCEDURE — 6370000000 HC RX 637 (ALT 250 FOR IP): Performed by: INTERNAL MEDICINE

## 2022-03-17 PROCEDURE — 93005 ELECTROCARDIOGRAM TRACING: CPT | Performed by: PSYCHIATRY & NEUROLOGY

## 2022-03-17 PROCEDURE — 6370000000 HC RX 637 (ALT 250 FOR IP): Performed by: PSYCHIATRY & NEUROLOGY

## 2022-03-17 PROCEDURE — 1240000000 HC EMOTIONAL WELLNESS R&B

## 2022-03-17 PROCEDURE — 99232 SBSQ HOSP IP/OBS MODERATE 35: CPT | Performed by: PSYCHIATRY & NEUROLOGY

## 2022-03-17 PROCEDURE — 99232 SBSQ HOSP IP/OBS MODERATE 35: CPT | Performed by: INTERNAL MEDICINE

## 2022-03-17 PROCEDURE — APPSS30 APP SPLIT SHARED TIME 16-30 MINUTES

## 2022-03-17 RX ADMIN — PALIPERIDONE 3 MG: 3 TABLET, EXTENDED RELEASE ORAL at 08:56

## 2022-03-17 RX ADMIN — CIPROFLOXACIN 500 MG: 500 TABLET, FILM COATED ORAL at 08:56

## 2022-03-17 RX ADMIN — MULTIPLE VITAMINS W/ MINERALS TAB 1 TABLET: TAB at 08:56

## 2022-03-17 RX ADMIN — CIPROFLOXACIN 500 MG: 500 TABLET, FILM COATED ORAL at 20:58

## 2022-03-17 RX ADMIN — THIAMINE HCL TAB 100 MG 100 MG: 100 TAB at 08:56

## 2022-03-17 RX ADMIN — HYDROXYZINE HYDROCHLORIDE 50 MG: 50 TABLET, FILM COATED ORAL at 20:58

## 2022-03-17 NOTE — PLAN OF CARE
Problem: Altered Mood, Depressive Behavior:  Goal: Able to verbalize and/or display a decrease in depressive symptoms  Description: Able to verbalize and/or display a decrease in depressive symptoms  3/17/2022 1627 by Otis Goodell  Outcome: Ongoing  Pt isolates in bed. Comes out for meals only. Pt relates to feeling down and depressed. Pt wants to follow up from here with drug and alcohol treatment inpt. Problem: Altered Mood, Depressive Behavior:  Goal: Ability to disclose and discuss suicidal ideas will improve  Description: Ability to disclose and discuss suicidal ideas will improve  3/17/2022 1627 by Otis Goodell  Outcome: Ongoing  Pt denies current suicidal thoughts. Pt. Remains safe on the unit. Q 15 minute checks for safety maintained. Problem: Altered Mood, Psychotic Behavior:  Goal: Able to verbalize decrease in frequency and intensity of hallucinations  Description: Able to verbalize decrease in frequency and intensity of hallucinations  3/17/2022 1627 by Otis Goodell  Outcome: Ongoing  Pt denies hallucinations at this time. Pt. Is medication compliant. Aloof and seclusive to room.
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
## 2022-03-17 NOTE — GROUP NOTE
Group Therapy Note    Date: 3/17/2022    Group Start Time: 1100  Group End Time: 8037  Group Topic: Cognitive Skills    KEMI Marrero, CATHYS        Group Therapy Note    Attendees: 9/15         Pt did not participate in Cognitive Skills Group at 11:00am when encouraged by RT due to pt resting in room. Pt was offered talk time as an alternative to group but declined.          Discipline Responsible: Psychoeducational Specialist        Signature:  Layla Calhoun

## 2022-03-17 NOTE — PROGRESS NOTES
[place] [address] Internal Medicine    CONSULTATION / HISTORY AND PHYSICAL EXAMINATION            Date:   3/17/2022  Patient name:  Jeremy Manning  Date of admission:  3/15/2022  5:30 PM  MRN:   [id number]  Account:  [de-identified]  YOB: 1980  PCP:    . None (Inactive)  Room:   [address] [place] [address]  Code Status:    Full Code    Physician Requesting Consult: Den Fuentes MD    Reason for Consult:  medical management    Chief Complaint:     Chief Complaint   Patient presents with    Other     needs blood work for [facility] admission   uti  History Obtained From:     Patient medical record nursing staff    History of Present Illness:     Urinary Tract Infection: Patient complains of dysuria She has had symptoms for 1 week. Patient also complains of none. Patient denies one. Patient does have a history of recurrent UTI. Patient does not have a history of pyelonephritis. 3.17   Patient symptoms are much improved  On ciprofloxacin    Past Medical History:     Past Medical History:   Diagnosis Date    Anxiety     Bipolar 1 disorder ([address]     Depression         Past Surgical History:     Past Surgical History:   Procedure Laterality Date    OPEN TREATMENT BIMALLEOLAR ANKLE FRACTURE Right 9/2/2018    ANKLE OPEN REDUCTION INTERNAL FIXATION performed by Mildred Isbell MD at [address] [place] [address] Right 9/2/2018    RADIUS OPEN REDUCTION INTERNAL FIXATION performed by Mildred Isbell MD at [place] [address]        Medications Prior to Admission:     Prior to Admission medications    Not on File        Allergies:     Patient has no known allergies. Social History:     Tobacco:    reports that she has been smoking cigarettes. She has been smoking about 1.00 pack per day. She has never used smokeless tobacco.  Alcohol:      reports current alcohol use. Drug Use:  reports current drug use. Drugs: Cocaine and Marijuana (Kathlen Standing).     Family History:     No family history on file.    Review of Systems:     Positive and Negative as described in HPI. CONSTITUTIONAL:  negative for fevers, chills, sweats, fatigue, weight loss  HEENT:  negative for vision, hearing changes, runny nose, throat pain  RESPIRATORY:  negative for shortness of breath, cough, congestion, wheezing. CARDIOVASCULAR:  negative for chest pain, palpitations. GASTROINTESTINAL:  negative for nausea, vomiting, diarrhea, constipation, change in bowel habits, abdominal pain   GENITOURINARY:  Dysuria  INTEGUMENT:  negative for rash, skin lesions, easy bruising   HEMATOLOGIC/LYMPHATIC:  negative for swelling/edema   ALLERGIC/IMMUNOLOGIC:  negative for urticaria , itching  ENDOCRINE:  negative increase in drinking, increase in urination, hot or cold intolerance  MUSCULOSKELETAL:  negative joint pains, muscle aches, swelling of joints  NEUROLOGICAL:  negative for headaches, dizziness, lightheadedness, numbness, pain, tingling extremities    Physical Exam:     /71   Pulse 89   Temp 98 °F (36.7 °C) (Oral)   Resp 14   Ht 5' 4\" (1.626 m)   Wt 280 lb (127 kg)   SpO2 96%   BMI 48.06 kg/m²   Temp (24hrs), Av.9 °F (36.6 °C), Min:97.8 °F (36.6 °C), Max:98 °F (36.7 °C)    No results for input(s): POCGLU in the last 72 hours. No intake or output data in the 24 hours ending 22    General Appearance:  alert, well appearing, and in no acute distress  Mental status: oriented to person, place, and time with normal affect  Head:  normocephalic, atraumatic. Eye: no icterus, redness, pupils equal and reactive, extraocular eye movements intact, conjunctiva clear  Ear: normal external ear, no discharge, hearing intact  Nose:  no drainage noted  Mouth: mucous membranes moist  Neck: supple, no carotid bruits, thyroid not palpable  Lungs: Bilateral equal air entry, clear to ausculation, no wheezing, rales or rhonchi, normal effort  Cardiovascular: normal rate, regular rhythm, no murmur, gallop, rub.   Abdomen: Soft, nontender, nondistended, normal bowel sounds, no hepatomegaly or splenomegaly  Neurologic: There are no new focal motor or sensory deficits, normal muscle tone and bulk, no abnormal sensation, normal speech, cranial nerves II through XII grossly intact  Skin: No gross lesions, rashes, bruising or bleeding on exposed skin area  Extremities:  peripheral pulses palpable, no pedal edema or calf pain with palpation      Investigations:      Laboratory Testing:  No results found for this or any previous visit (from the past 24 hour(s)). Consultations:   IP CONSULT TO INTERNAL MEDICINE  IP CONSULT TO SOCIAL WORK  Assessment :      Primary Problem  Major depressive disorder, recurrent episode, severe, with psychosis ([facility]    LAURE/Herminia Landers [address] Problems    Diagnosis Date Noted    Major depressive disorder, recurrent episode, severe, with psychosis ([facility] [F33.3] 03/16/2022    Alcohol use disorder, severe, dependence ([facility] [F10.20] 03/16/2022    Marijuana use [F12.90] 03/16/2022    Cocaine use [F14.90] 03/16/2022    Amphetamine abuse ([facility] [F15.10] 03/16/2022       Plan:     42year-old patient morbidly obese class III or higher BMI 48.06 weighs 280 pounds  Complains of dysuria increased frequency lower abdominal pain but no flank pain no fever chills no nausea vomiting  Urinalysis suggestive of UTI cultures pending  We will start oral Cipro 500 twice a day awaiting cultures  No history of ESBL UTI    3/17   Patient symptoms improved  We will complete total 3 days of antibiotic  Ordering HbA1c  Last HbA1c was 7     Glenda Kussmaul, MD  3/17/2022  7:35 PM    Copy sent to Dr. Ruth Schaeffer (Inactive)    Please note that this chart was generated using voice recognition Dragon dictation software. Although every effort was made to ensure the accuracy of this automated transcription, some errors in transcription may have occurred.

## 2022-03-17 NOTE — PLAN OF CARE
[address] [place] [address]  Day 3 Interdisciplinary Treatment Plan NOTE    Review Date & Time: 3/17/2022                        1300    Admission Type:   Admission Type: Voluntary    Reason for admission:  Reason for Admission: suicidal thoughts  Estimated Length of Stay: 5-7 days  Estimated Discharge Date Update: to be determined by physician    PATIENT STRENGTHS:  Patient Strengths Strengths: No significant Physical Illness  Patient Strengths and Limitations:Limitations: Difficulty problem solving/relies on others to help solve problems  Addictive Behavior:Addictive Behavior  In the past 3 months, have you felt or has someone told you that you have a problem with:  : None  Do you have a history of Chemical Use?: No  Do you have a history of Alcohol Use?: No  Do you have a history of Street Drug Abuse?: Yes  Histroy of Prescripton Drug Abuse?: No  Medical Problems:  Past Medical History:   Diagnosis Date    Anxiety     Bipolar 1 disorder ([facility] [address]     Depression        Risk:  Fall RiskTotal: 71  Matt Scale Matt Scale Score: 22  BVC    Change in scores no Changes to plan of Care no    Status EXAM:   Status and Exam  Normal: No  Facial Expression: Avoids Gaze  Affect: Blunt  Level of Consciousness: Alert  Mood:Normal: No  Mood: Anxious,Depressed,Irritable  Motor Activity:Normal: No  Motor Activity: Decreased  Interview Behavior: Evasive  Preception: [place] to Person,[place] to Time,[place] to Place,[place] to Situation  Attention:Normal: No  Attention: Distractible  Thought Processes: Circumstantial  Thought Content:Normal: No  Thought Content: Preoccupations  Hallucinations:  Auditory (Comment)  Delusions: No  Memory:Normal: No  Memory: Poor Recent,Poor Remote  Insight and Judgment: No  Insight and Judgment: Poor Insight,Unmotivated  Present Suicidal Ideation: No  Present Homicidal Ideation: No    Daily Assessment Last Entry:   Daily Sleep (WDL): Within Defined Limits         Patient Currently in Pain: Denies  Daily Nutrition (WDL): Within Defined Limits    Patient Monitoring:  Frequency of Checks: 4 times per hour, close    Psychiatric Symptoms:   Depression Symptoms  Depression Symptoms: No problems reported or observed. Anxiety Symptoms  Anxiety Symptoms: No problems reported or observed. Kassy Symptoms  Kassy Symptoms: No problems reported or observed. Psychosis Symptoms  Delusion Type: No problems reported or observed. Suicide Risk CSSR-S:  1) Within the past month, have you wished you were dead or wished you could go to sleep and not wake up? : Yes  2) Have you actually had any thoughts of killing yourself? : Yes  3) Have you been thinking about how you might kill yourself? : No  5) Have you started to work out or worked out the details of how to kill yourself? Do you intend to carry out this plan? : No  6) Have you ever done anything, started to do anything, or prepared to do anything to end your life?: No  Change in Result                  no                 Change in Plan of care               no      EDUCATION:   EDUCATION:   Learner Progress Toward Treatment Goals: Reviewed results and recommendations of this team, Reviewed group plan and strategies, Reviewed signs, symptoms and risk of self harm and violent behavior, Reviewed goals and plan of care    Method:small group, individual verbal education    Outcome:verbalized by patient, but needs reinforcement to obtain goals    PATIENT GOALS:  Short term: \"get rid of headache,talk with Doctor about meds\"  Long term: \" get into a recovery facility/[place], work on sobriety\".     PLAN/TREATMENT RECOMMENDATIONS UPDATE: continue with group therapies, increased socialization, continue planning for after discharge goals, continue with medication compliance    SHORT-TERM GOALS UPDATE:   Time frame for Short-Term Goals: 5-7 days    LONG-TERM GOALS UPDATE:   Time frame for Long-Term Goals: 6 months  Members Present in Team Meeting: See Signature Sheet    Shraddha Man CTRS
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
## 2022-03-18 LAB
EKG ATRIAL RATE: 89 BPM
EKG P AXIS: 69 DEGREES
EKG P-R INTERVAL: 136 MS
EKG Q-T INTERVAL: 360 MS
EKG QRS DURATION: 78 MS
EKG QTC CALCULATION (BAZETT): 438 MS
EKG R AXIS: 55 DEGREES
EKG T AXIS: 64 DEGREES
EKG VENTRICULAR RATE: 89 BPM
ESTIMATED AVERAGE GLUCOSE: 154 MG/DL
HBA1C MFR BLD: 7 % (ref 4–6)

## 2022-03-18 PROCEDURE — 99232 SBSQ HOSP IP/OBS MODERATE 35: CPT | Performed by: PSYCHIATRY & NEUROLOGY

## 2022-03-18 PROCEDURE — 93010 ELECTROCARDIOGRAM REPORT: CPT | Performed by: INTERNAL MEDICINE

## 2022-03-18 PROCEDURE — 36415 COLL VENOUS BLD VENIPUNCTURE: CPT

## 2022-03-18 PROCEDURE — 1240000000 HC EMOTIONAL WELLNESS R&B

## 2022-03-18 PROCEDURE — 6370000000 HC RX 637 (ALT 250 FOR IP): Performed by: PSYCHIATRY & NEUROLOGY

## 2022-03-18 PROCEDURE — 83036 HEMOGLOBIN GLYCOSYLATED A1C: CPT

## 2022-03-18 PROCEDURE — APPSS30 APP SPLIT SHARED TIME 16-30 MINUTES: Performed by: PSYCHIATRY & NEUROLOGY

## 2022-03-18 PROCEDURE — 6370000000 HC RX 637 (ALT 250 FOR IP): Performed by: INTERNAL MEDICINE

## 2022-03-18 PROCEDURE — 99232 SBSQ HOSP IP/OBS MODERATE 35: CPT | Performed by: INTERNAL MEDICINE

## 2022-03-18 RX ADMIN — TRAZODONE HYDROCHLORIDE 50 MG: 50 TABLET ORAL at 20:45

## 2022-03-18 RX ADMIN — CIPROFLOXACIN 500 MG: 500 TABLET, FILM COATED ORAL at 09:14

## 2022-03-18 RX ADMIN — LORAZEPAM 1 MG: 1 TABLET ORAL at 20:45

## 2022-03-18 RX ADMIN — PALIPERIDONE 3 MG: 3 TABLET, EXTENDED RELEASE ORAL at 09:14

## 2022-03-18 RX ADMIN — THIAMINE HCL TAB 100 MG 100 MG: 100 TAB at 09:14

## 2022-03-18 RX ADMIN — MULTIPLE VITAMINS W/ MINERALS TAB 1 TABLET: TAB at 09:14

## 2022-03-18 RX ADMIN — CIPROFLOXACIN 500 MG: 500 TABLET, FILM COATED ORAL at 20:45

## 2022-03-18 NOTE — GROUP NOTE
Group Therapy Note    Date: 3/18/2022    Group Start Time: 1000  Group End Time: 6543  Group Topic: Psychoeducation    [facility]    LAVERNE Lewis LSW        Group Therapy Note    patient refused to attend psychoeducational group at 10:00am after encouragement from staff.       Signature:  LAVERNE Lewis LSW

## 2022-03-18 NOTE — PLAN OF CARE
Problem: Altered Mood, Depressive Behavior:  Goal: Able to verbalize and/or display a decrease in depressive symptoms  Description: Able to verbalize and/or display a decrease in depressive symptoms  3/18/2022 1221 by Kate Julian RN  Outcome: Ongoing  Note: Patient reports feeling much better and would like to go to a treatment program after discharge, patient was apprehensive when asked if she if feeling ready for discharge, pleasant and cooperative with all care, tends to isolate to herself but pleasant and cooperative with all care provided. Problem: Altered Mood, Depressive Behavior:  Goal: Ability to disclose and discuss suicidal ideas will improve  Description: Ability to disclose and discuss suicidal ideas will improve  3/18/2022 1221 by Kate Julian RN  Outcome: Ongoing  Note:  Pt denies suicidal ideations at this time. Pt agreed to seek staff at anytime he/she felt like any urges to harm self would arise. Safety checks maintained [id number].       Problem: Tobacco Use:  Goal: Inpatient tobacco use cessation counseling participation  Description: Inpatient tobacco use cessation counseling participation  3/18/2022 1221 by Kate Julian RN    Problem: Altered Mood, Psychotic Behavior:  Goal: Able to verbalize decrease in frequency and intensity of hallucinations  Description: Able to verbalize decrease in frequency and intensity of hallucinations  3/18/2022 1221 by Kate Julian RN  Outcome: Ongoing  Note: Patient denies hallucinations, reports feeling much better in comparison to admission

## 2022-03-18 NOTE — PROGRESS NOTES
Daily Progress Note  3/18/2022    Patient Name: Rakan Irish: Suicidal ideation         SUBJECTIVE:        Nursing staff report the patient has maintained medication adherence and has not required emergency medications in the last 24 hours. She has remained more isolative coming to milieu for personal needs and nutrition only. Patient is agreeable to assessment at bedside where she remains blunted and withdrawn requiring much time to engage. She reports her mood as \"still down \"but reports improving suicidal ideation as it relates to the safety of the milieu. She continues to endorse auditory hallucinations but denies that they are as distressing currently and feels that the medication regimen including Invega have been beneficial.  We discussed her updated EKG with QTC of 438. Patient denies withdrawal symptoms related to alcohol or having any questions or concerns. Writer encouraged patient to attend groups on the unit. At this time, the patient is not appropriate for a lower level of care. There is risk of decompensation and patient warrants further hospitalization for safety and stabilization. Appetite:  [x] Normal/Adequate/Unchanged  [] Increased  [] Decreased      Sleep:       [] Normal/Adequate/Unchanged  [x] Fair  [] Poor      Group Attendance on Unit:   [] Yes  [] Selectively    [x] No    Medication Side Effects: Patient denies any medication side effects at the time of assessment. Mental Status Exam  Level of consciousness: Alert and awake. Appearance: Appropriate attire for setting, seated on bed, with poor grooming and hygiene. Behavior/Motor: Withdrawn, approachable, no psychomotor abnormalities. Attitude toward examiner: Cooperative, moderately attentive, good eye contact. Speech: Normal rate, low volume, normal tone. Mood:  Patient reports \" still down\". Affect: Mood congruent, blunted  Thought processes: Linear and coherent.    Thought content: Denies homicidal ideation. Suicidal Ideation: Active suicidal ideations, without current plan or intent, contracts for safety on the unit. Delusions: No evidence of delusions. Denies paranoia. Perceptual Disturbance: Patient does not appear to be responding to internal stimuli. Reports improvement in auditory hallucinations. Denies visual hallucinations. Cognition: Oriented to self, location, time, and situation. Memory: Intact. Insight & Judgement: Poor. Data   height is 5' 4\" (1.626 m) and weight is 280 lb (127 kg). Her oral temperature is 98.2 °F (36.8 °C). Her blood pressure is 146/90 (abnormal) and her pulse is 93. Her respiration is 18 and oxygen saturation is 96%.    Labs:   Admission on 03/15/2022   Component Date Value Ref Range Status    WBC 03/15/2022 7.5  3.5 - 11.0 k/uL Final    RBC 03/15/2022 5.09  4.0 - 5.2 m/uL Final    Hemoglobin 03/15/2022 14.3  12.0 - 16.0 g/dL Final    Hematocrit 03/15/2022 43.5  36 - 46 % Final    MCV 03/15/2022 85.5  80 - 100 fL Final    MCH 03/15/2022 28.0  26 - 34 pg Final    MCHC 03/15/2022 32.8  31 - 37 g/dL Final    RDW 03/15/2022 13.1  11.5 - 14.9 % Final    Platelets 39/14/8753 348  150 - 450 k/uL Final    MPV 03/15/2022 7.6  6.0 - 12.0 fL Final    Seg Neutrophils 03/15/2022 40  36 - 66 % Final    Lymphocytes 03/15/2022 45* 24 - 44 % Final    Atypical Lymphocytes 03/15/2022 10  % Final    Monocytes 03/15/2022 5  1 - 7 % Final    Eosinophils % 03/15/2022 0  0 - 4 % Final    Basophils 03/15/2022 0  0 - 2 % Final    Segs Absolute 03/15/2022 3.00  1.3 - 9.1 k/uL Final    Absolute Lymph # 03/15/2022 3.37  1.0 - 4.8 k/uL Final    Atypical Lymphocytes Absolute 03/15/2022 0.75  k/uL Final    Absolute Mono # 03/15/2022 0.38  0.1 - 1.3 k/uL Final    Absolute Eos # 03/15/2022 0.00  0.0 - 0.4 k/uL Final    Basophils Absolute 03/15/2022 0.00  0.0 - 0.2 k/uL Final    Morphology 03/15/2022 Normal   Final    Glucose 03/15/2022 201* 70 - 99 mg/dL Final  BUN 03/15/2022 10  6 - 20 mg/dL Final    CREATININE 03/15/2022 0.63  0.50 - 0.90 mg/dL Final    Calcium 03/15/2022 8.8  8.6 - 10.4 mg/dL Final    Sodium 03/15/2022 138  135 - 144 mmol/L Final    Potassium 03/15/2022 3.4* 3.7 - 5.3 mmol/L Final    Chloride 03/15/2022 101  98 - 107 mmol/L Final    CO2 03/15/2022 24  20 - 31 mmol/L Final    Anion Gap 03/15/2022 13  9 - 17 mmol/L Final    GFR Non- 03/15/2022 >60  >60 mL/min Final    GFR  03/15/2022 >60  >60 mL/min Final    GFR Comment 03/15/2022        Final    Comment: Average GFR for 38-51 years old:   80 mL/min/1.73sq m  Chronic Kidney Disease:   <60 mL/min/1.73sq m  Kidney failure:   <15 mL/min/1.73sq m              eGFR calculated using average adult body mass.  Additional eGFR calculator available at:        [employer]            Acetaminophen Level 03/15/2022 <5* 10 - 30 ug/mL Final    Ethanol 03/15/2022 <10  <10 mg/dL Final    Ethanol percent 03/15/2022 <5.098  % Final    Salicylate Lvl 92/27/5965 <1* 3 - 10 mg/dL Final    Toxic Tricyclic Sc,Blood 69/52/1829 WRONG TEST ORDERED  NEGATIVE Final    Amphetamine Screen, Ur 03/15/2022 POSITIVE* NEGATIVE Final    Comment:       (Positive cutoff 1000 ng/mL)                  Barbiturate Screen, Ur 03/15/2022 NEGATIVE  NEGATIVE Final    Comment:       (Positive cutoff 200 ng/mL)                  Benzodiazepine Screen, Urine 03/15/2022 NEGATIVE  NEGATIVE Final    Comment:       (Positive cutoff 200 ng/mL)                  Cocaine Metabolite, Urine 03/15/2022 POSITIVE* NEGATIVE Final    Comment:       (Positive cutoff 300 ng/mL)                  Methadone Screen, Urine 03/15/2022 NEGATIVE  NEGATIVE Final    Comment:       (Positive cutoff 300 ng/mL)                  Opiates, Urine 03/15/2022 NEGATIVE  NEGATIVE Final    Comment:       (Positive cutoff 300 ng/mL)                  Phencyclidine, Urine 03/15/2022 NEGATIVE  NEGATIVE Final    Comment:       (Positive cutoff 25 ng/mL)                  Cannabinoid Scrn, Ur 03/15/2022 POSITIVE* NEGATIVE Final    Comment:       (Positive cutoff 50 ng/mL)                  Oxycodone Screen, Ur 03/15/2022 NEGATIVE  NEGATIVE Final    Comment:       (Positive cutoff 100 ng/mL)                  Test Information 03/15/2022 Assay provides medical screening only. The absence of expected drug(s) and/or metabolite(s) may indicate diluted or adulterated urine, limitations of testing or timing of collection. Final    Comment: Testing for legal purposes should be confirmed by another method. To request confirmation   of test result, please call the lab within 7 days of sample submission.  HCG(Urine) Pregnancy Test 03/15/2022 NEGATIVE  NEGATIVE Final    Comment: Specimens with hCG levels near the threshold of the test (25 mIU/mL) may give a negative or   indeterminate result. In such cases, another test should be performed with a new specimen   in 48-72 hours. If early pregnancy is suspected clinically in this setting, correlation   with quantitative serum b-hCG level is suggested.  Color, UA 03/15/2022 Dark Yellow* Yellow Final    Turbidity UA 03/15/2022 Cloudy* Clear Final    Glucose, Ur 03/15/2022 NEGATIVE  NEGATIVE Final    Bilirubin Urine 03/15/2022 NEGATIVE  NEGATIVE Final    Ketones, Urine 03/15/2022 SMALL* NEGATIVE Final    Specific [place], UA 03/15/2022 1.026  1.000 - 1.030 Final    Urine Hgb 03/15/2022 NEGATIVE  NEGATIVE Final    pH, UA 03/15/2022 5.5  5.0 - 8.0 Final    Protein, UA 03/15/2022 1+* NEGATIVE Final    Urobilinogen, Urine 03/15/2022 Normal  Normal Final    Nitrite, Urine 03/15/2022 NEGATIVE  NEGATIVE Final    Leukocyte Esterase, Urine 03/15/2022 SMALL* NEGATIVE Final    Tricyclic Antidep,Urine 79/92/2716 NEGATIVE  NEGATIVE Final    Comment:       (Positive cutoff 1000 ng/mL)  Assay provides rapid clinical screening only.   Presumptive positive results for legal purposes should be confirmed by another method. To request confirmation, please call the   lab within 7 days of sample submission.  WBC, UA 03/15/2022 21 TO 50  /HPF Final    RBC, UA 03/15/2022 0 TO 2  /HPF Final    Casts UA 03/15/2022 3 to 5  /LPF Final    Epithelial Cells UA 03/15/2022 10 TO 20  /HPF Final    Bacteria, UA 03/15/2022 MANY* None Final    Specimen Description 03/15/2022 . CLEAN CATCH URINE   Final    Culture 03/15/2022 NO SIGNIFICANT GROWTH   Final    Ventricular Rate 03/17/2022 89  BPM Final    Atrial Rate 03/17/2022 89  BPM Final    P-R Interval 03/17/2022 136  ms Final    QRS Duration 03/17/2022 78  ms Final    Q-T Interval 03/17/2022 360  ms Final    QTc Calculation (Bazett) 03/17/2022 438  ms Final    P Axis 03/17/2022 69  degrees Final    R Axis 03/17/2022 55  degrees Final    T Axis 03/17/2022 64  degrees Final         Reviewed patient's current plan of care and vital signs with nursing staff.     Labs reviewed: [x] Yes  Last EKG in EMR reviewed: [x] Yes updated hardcopy in EMR 3/17/2022  QTc:438    Medications  Current Facility-Administered Medications: acetaminophen (TYLENOL) tablet 650 mg, 650 mg, Oral, Q4H PRN  aluminum & magnesium hydroxide-simethicone (MAALOX) 200-200-20 MG/5ML suspension 30 mL, 30 mL, Oral, Q6H PRN  hydrOXYzine (ATARAX) tablet 50 mg, 50 mg, Oral, TID PRN  ibuprofen (ADVIL;MOTRIN) tablet 400 mg, 400 mg, Oral, Q6H PRN  nicotine (NICODERM CQ) 14 MG/24HR 1 patch, 1 patch, TransDERmal, Daily  polyethylene glycol (GLYCOLAX) packet 17 g, 17 g, Oral, Daily PRN  traZODone (DESYREL) tablet 50 mg, 50 mg, Oral, Nightly PRN  paliperidone (INVEGA) extended release tablet 3 mg, 3 mg, Oral, Daily  LORazepam (ATIVAN) tablet 1 mg, 1 mg, Oral, Q1H PRN **OR** LORazepam (ATIVAN) injection 1 mg, 1 mg, IntraMUSCular, Q1H PRN **OR** LORazepam (ATIVAN) tablet 2 mg, 2 mg, Oral, Q1H PRN **OR** LORazepam (ATIVAN) injection 2 mg, 2 mg, IntraMUSCular, Q1H PRN **OR** LORazepam (ATIVAN) tablet 3 mg, 3 mg, Oral, Q1H PRN **OR** LORazepam (ATIVAN) injection 3 mg, 3 mg, IntraMUSCular, Q1H PRN **OR** LORazepam (ATIVAN) tablet 4 mg, 4 mg, Oral, Q1H PRN **OR** LORazepam (ATIVAN) injection 4 mg, 4 mg, IntraMUSCular, Q1H PRN  thiamine mononitrate tablet 100 mg, 100 mg, Oral, Daily  therapeutic multivitamin-minerals 1 tablet, 1 tablet, Oral, Daily  ciprofloxacin (CIPRO) tablet 500 mg, 500 mg, Oral, 2 times per day    ASSESSMENT  Major depressive disorder, recurrent episode, severe, with psychosis ([facility] [address]         HANDOFF  Patient symptoms are:  Showing modest improvement  Medications as determined by attending physician  Encourage participation in groups and milieu. Probable discharge is to be determined by MD    Electronically signed by FEDERICO Roman CNP on 3/18/2022 at 2:07 PM    **This report has been created using voice recognition software. It may contain minor errors which are inherent in voice recognition technology. **   I independently saw and evaluated the patient. I reviewed the  documentation above. Any additional comments or changes to the midlevel provider's documentation are stated below otherwise agree with assessment. The patient was seen at bedside. She continues to be isolative and spends most of her time in bed. The patient reports ongoing suicidal ideation but states that the medication is helping her feel a little better. She is denying auditory hallucinations or paranoia today. I encouraged her to seek treatment for her substance use disorder. PLAN  Medications as noted above  Attempt to develop insight  Psycho-education conducted. Supportive Therapy conducted. Probable discharge is 3-6 days.    Follow-up daily while on inpatient unit    Electronically signed by Anna Hernandez MD on 3/18/22 at 6:40 PM EDT

## 2022-03-18 NOTE — CARE COORDINATION
Pt came to writer to discuss AOD tx options. AOD resource folder provided and process explained to pt.

## 2022-03-18 NOTE — PROGRESS NOTES
[place] [address] Internal Medicine    CONSULTATION / HISTORY AND PHYSICAL EXAMINATION            Date:   3/18/2022  Patient name:  Merline Hageman  Date of admission:  3/15/2022  5:30 PM  MRN:   [id number]  Account:  [de-identified]  YOB: 1980  PCP:    . None (Inactive)  Room:   [address] [place] [address]  Code Status:    Full Code    Physician Requesting Consult: Kyle Marcano MD    Reason for Consult:  medical management    Chief Complaint:     Chief Complaint   Patient presents with    Other     needs blood work for [facility] admission   uti  History Obtained From:     Patient medical record nursing staff    History of Present Illness:     Urinary Tract Infection: Patient complains of dysuria She has had symptoms for 1 week. Patient also complains of none. Patient denies one. Patient does have a history of recurrent UTI. Patient does not have a history of pyelonephritis. 3.17   Patient symptoms are much improved  On ciprofloxacin    Past Medical History:     Past Medical History:   Diagnosis Date    Anxiety     Bipolar 1 disorder ([address]     Depression         Past Surgical History:     Past Surgical History:   Procedure Laterality Date    OPEN TREATMENT BIMALLEOLAR ANKLE FRACTURE Right 9/2/2018    ANKLE OPEN REDUCTION INTERNAL FIXATION performed by Mary Rodriguez MD at [address] [place] [address] Right 9/2/2018    RADIUS OPEN REDUCTION INTERNAL FIXATION performed by Mary Rodriguez MD at [place] [address]        Medications Prior to Admission:     Prior to Admission medications    Not on File        Allergies:     Patient has no known allergies. Social History:     Tobacco:    reports that she has been smoking cigarettes. She has been smoking about 1.00 pack per day. She has never used smokeless tobacco.  Alcohol:      reports current alcohol use. Drug Use:  reports current drug use. Drugs: Cocaine and Marijuana ([place] [facility]).     Family History:     No family history on file.    Review of Systems:     Positive and Negative as described in HPI. CONSTITUTIONAL:  negative for fevers, chills, sweats, fatigue, weight loss  HEENT:  negative for vision, hearing changes, runny nose, throat pain  RESPIRATORY:  negative for shortness of breath, cough, congestion, wheezing. CARDIOVASCULAR:  negative for chest pain, palpitations. GASTROINTESTINAL:  negative for nausea, vomiting, diarrhea, constipation, change in bowel habits, abdominal pain   GENITOURINARY:  Dysuria  INTEGUMENT:  negative for rash, skin lesions, easy bruising   HEMATOLOGIC/LYMPHATIC:  negative for swelling/edema   ALLERGIC/IMMUNOLOGIC:  negative for urticaria , itching  ENDOCRINE:  negative increase in drinking, increase in urination, hot or cold intolerance  MUSCULOSKELETAL:  negative joint pains, muscle aches, swelling of joints  NEUROLOGICAL:  negative for headaches, dizziness, lightheadedness, numbness, pain, tingling extremities    Physical Exam:     BP (!) 146/90   Pulse 93   Temp 98.2 °F (36.8 °C) (Oral)   Resp 18   Ht 5' 4\" (1.626 m)   Wt 280 lb (127 kg)   SpO2 96%   BMI 48.06 kg/m²   Temp (24hrs), Av.1 °F (36.7 °C), Min:98 °F (36.7 °C), Max:98.2 °F (36.8 °C)    No results for input(s): POCGLU in the last 72 hours. No intake or output data in the 24 hours ending 22 3838    General Appearance:  alert, well appearing, and in no acute distress  Mental status: oriented to person, place, and time with normal affect  Head:  normocephalic, atraumatic. Eye: no icterus, redness, pupils equal and reactive, extraocular eye movements intact, conjunctiva clear  Ear: normal external ear, no discharge, hearing intact  Nose:  no drainage noted  Mouth: mucous membranes moist  Neck: supple, no carotid bruits, thyroid not palpable  Lungs: Bilateral equal air entry, clear to ausculation, no wheezing, rales or rhonchi, normal effort  Cardiovascular: normal rate, regular rhythm, no murmur, gallop, rub.   Abdomen: was 7   3/18   Patient HbA1c 7  Starting patient Metformin 500 twice a day      Josr Tsang MD  3/18/2022  5:37 PM    Copy sent to Dr. Ruth Casey (Inactive)    Please note that this chart was generated using voice recognition Dragon dictation software. Although every effort was made to ensure the accuracy of this automated transcription, some errors in transcription may have occurred.

## 2022-03-18 NOTE — GROUP NOTE
Group Therapy Note    Date: 3/18/2022    Group Start Time: 1430  Group End Time: 1344  Group Topic: Cognitive Skills    KEMI BHLYUBOV Howard, CTRS        Group Therapy Note    Attendees: 3/15         Pt did not participate in Cognitive Skills Group at 14:30 when encouraged by RT due to pt resting in room. Pt was offered talk time as an alternative to group but declined.          Discipline Responsible: Psychoeducational Specialist        Signature:  Didi Gabriel

## 2022-03-18 NOTE — PLAN OF CARE
Problem: Altered Mood, Depressive Behavior:  Goal: Able to verbalize and/or display a decrease in depressive symptoms  Description: Able to verbalize and/or display a decrease in depressive symptoms  3/18/2022 0148 by Annemarie Bullock RN  Outcome: Ongoing   Pt isolative to room all of shift and unable to display decrease in depressive symptoms. Pt is flat and evasive upon interview. Pt is encouraged to shower. She denies suicidal ideations. Problem: Altered Mood, Depressive Behavior:  Goal: Ability to disclose and discuss suicidal ideas will improve  Description: Ability to disclose and discuss suicidal ideas will improve  3/18/2022 0148 by Annemarie Bullock RN  Outcome: Ongoing   Pt denies suicidal ideations. Problem: Altered Mood, Psychotic Behavior:  Goal: Able to verbalize decrease in frequency and intensity of hallucinations  Description: Able to verbalize decrease in frequency and intensity of hallucinations  3/18/2022 0148 by Annemarie Bullock RN  Outcome: Ongoing   Pt denies auditory or visual hallucinations at this time. Problem: Tobacco Use:  Goal: Inpatient tobacco use cessation counseling participation  Description: Inpatient tobacco use cessation counseling participation  3/18/2022 0148 by Annemarie Bullock RN  Outcome: Ongoing   Pt refuses tobacco cessation counseling.

## 2022-03-19 PROCEDURE — 6370000000 HC RX 637 (ALT 250 FOR IP): Performed by: INTERNAL MEDICINE

## 2022-03-19 PROCEDURE — 6370000000 HC RX 637 (ALT 250 FOR IP): Performed by: PSYCHIATRY & NEUROLOGY

## 2022-03-19 PROCEDURE — 1240000000 HC EMOTIONAL WELLNESS R&B

## 2022-03-19 PROCEDURE — 99232 SBSQ HOSP IP/OBS MODERATE 35: CPT | Performed by: NURSE PRACTITIONER

## 2022-03-19 PROCEDURE — 99231 SBSQ HOSP IP/OBS SF/LOW 25: CPT | Performed by: INTERNAL MEDICINE

## 2022-03-19 RX ADMIN — METFORMIN HYDROCHLORIDE 500 MG: 500 TABLET ORAL at 18:40

## 2022-03-19 RX ADMIN — MULTIPLE VITAMINS W/ MINERALS TAB 1 TABLET: TAB at 08:31

## 2022-03-19 RX ADMIN — PALIPERIDONE 3 MG: 3 TABLET, EXTENDED RELEASE ORAL at 08:31

## 2022-03-19 RX ADMIN — THIAMINE HCL TAB 100 MG 100 MG: 100 TAB at 08:31

## 2022-03-19 RX ADMIN — CIPROFLOXACIN 500 MG: 500 TABLET, FILM COATED ORAL at 08:31

## 2022-03-19 RX ADMIN — METFORMIN HYDROCHLORIDE 500 MG: 500 TABLET ORAL at 08:31

## 2022-03-19 NOTE — GROUP NOTE
Group Therapy Note    Date: 3/19/2022    Group Start Time: 1330  Group End Time: 1430  Group Topic: Relaxation    [facility]    Loretta Anne, CTRS    Pt did not attend 1330 relaxation group d/t resting in room despite staff invitation to attend. 1:1 talk time offered as alternative to group session, pt declined.           Signature:  Alonzo Dickson

## 2022-03-19 NOTE — PLAN OF CARE
Problem: Altered Mood, Depressive Behavior:  Goal: Able to verbalize and/or display a decrease in depressive symptoms  Description: Able to verbalize and/or display a decrease in depressive symptoms  3/18/2022 2323 by Camila Aviles RN  Outcome: Ongoing     Problem: Altered Mood, Depressive Behavior:  Goal: Ability to disclose and discuss suicidal ideas will improve  Description: Ability to disclose and discuss suicidal ideas will improve  3/18/2022 2323 by Camila Aviles RN  Outcome: Ongoing     Problem: Altered Mood, Psychotic Behavior:  Goal: Able to verbalize decrease in frequency and intensity of hallucinations  Description: Able to verbalize decrease in frequency and intensity of hallucinations  3/18/2022 2323 by Camila Aviles RN  Outcome: Ongoing   Patient remains in bed most of shift, out for needs and able to express self appropriately. Patient denies thoughts to harm her self or others denies hallucinations this shift. Patient does report some withdrawal symptoms with elevated blood pressure and pulse, prn ativan give per CIWA scale.

## 2022-03-19 NOTE — PLAN OF CARE
Problem: Altered Mood, Depressive Behavior:  Goal: Able to verbalize and/or display a decrease in depressive symptoms  Description: Able to verbalize and/or display a decrease in depressive symptoms  Outcome: Ongoing  Goal: Ability to disclose and discuss suicidal ideas will improve  Description: Ability to disclose and discuss suicidal ideas will improve  Outcome: Ongoing  Note: Patient denies any suicidal/homicidal ideations or intent to inflict self harm, she reports feeling depressed with general anxiety but states that the medications are improving her mood. Patient Patient isolates to room for rest but displays no unusual behaviors. Patient is capable of insight and understands the benefits of programming. Problem: Altered Mood, Psychotic Behavior:  Goal: Able to verbalize decrease in frequency and intensity of hallucinations  Description: Able to verbalize decrease in frequency and intensity of hallucinations  Outcome: Ongoing  Note: Patient denies any hallucinations. Problem: Tobacco Use:  Goal: Inpatient tobacco use cessation counseling participation  Description: Inpatient tobacco use cessation counseling participation  Outcome: Ongoing  Note: Patient declines any tobacco cessation literature.

## 2022-03-19 NOTE — PROGRESS NOTES
Daily Progress Note  3/19/2022    Patient Name: Juliet Abad    CHIEF COMPLAINT: Suicidal ideation         SUBJECTIVE:        Patient seen face-to-face for follow-up assessment. She has been compliant with scheduled Invega 3 mg and is denying any side effects at this time. She is resting in bed at time of assessment and is minimally cooperative with discussion. Rolls away from Eliana Karthik with continued questions. Speech is slow, low in volume, and poorly articulated. Difficult to make out what she is saying. She reports feeling \"better\" but her affect is not congruent. Appears depressed and despondent. Grooming and hygiene is poor. She has been isolating to self and not attending group programming. She is denying perceptual disturbances. Reviewed for alcohol withdrawal, but patient is noncompliant with physical assessment. She did require lorazepam 1 mg yesterday secondary to her CIWA score being 9. Reviewed patient's vitals. Within appropriate range. No significant alcohol withdrawal symptoms observed. Discussed disposition planning with social work today. They report that patient has a plan to discharge to [facility] recovery treatment inpatient program.  Unknown if patient has made phone call to determine bed status and appropriateness for program.  Patient unable to contract for safety at lower level of care. Expressing concern for relapse and states that she fears she may overdose if released to the community. Requires inpatient hospitalization for stability. Appetite:  [x] Normal/Adequate/Unchanged  [] Increased  [] Decreased      Sleep:       [] Normal/Adequate/Unchanged  [x] Fair  [] Poor      Group Attendance on Unit:   [] Yes  [] Selectively    [x] No    Medication Side Effects: Patient denies any medication side effects at the time of assessment. Mental Status Exam  Level of consciousness: Alert and awake.    Appearance: Appropriate attire for setting, resting in bed, with poor grooming and hygiene. Behavior/Motor: Withdrawn, approachable  Attitude toward examiner: Noncooperative, no eye contact  Speech: Selectively mute, increased latency, poor articulation, low volume  Mood:  Patient reports \" depressed\". Affect: Irritable  Thought processes: Linear and coherent. Evasive  Thought content: Denies homicidal ideation. Suicidal Ideation: Active suicidal ideations, unable to contract for safety off unit  Delusions: No evidence of delusions. Denies paranoia. Perceptual Disturbance: Patient does not appear to be responding to internal stimuli. Reports improvement in auditory hallucinations. Denies visual hallucinations. Cognition: Oriented to self, location, time, and situation. Memory: Intact. Insight & Judgement: Poor. Data   height is 5' 4\" (1.626 m) and weight is 280 lb (127 kg). Her temperature is 97.8 °F (36.6 °C). Her blood pressure is 126/89 and her pulse is 98. Her respiration is 14 and oxygen saturation is 96%.    Labs:   Admission on 03/15/2022   Component Date Value Ref Range Status    WBC 03/15/2022 7.5  3.5 - 11.0 k/uL Final    RBC 03/15/2022 5.09  4.0 - 5.2 m/uL Final    Hemoglobin 03/15/2022 14.3  12.0 - 16.0 g/dL Final    Hematocrit 03/15/2022 43.5  36 - 46 % Final    MCV 03/15/2022 85.5  80 - 100 fL Final    MCH 03/15/2022 28.0  26 - 34 pg Final    MCHC 03/15/2022 32.8  31 - 37 g/dL Final    RDW 03/15/2022 13.1  11.5 - 14.9 % Final    Platelets 45/62/8304 348  150 - 450 k/uL Final    MPV 03/15/2022 7.6  6.0 - 12.0 fL Final    Seg Neutrophils 03/15/2022 40  36 - 66 % Final    Lymphocytes 03/15/2022 45* 24 - 44 % Final    Atypical Lymphocytes 03/15/2022 10  % Final    Monocytes 03/15/2022 5  1 - 7 % Final    Eosinophils % 03/15/2022 0  0 - 4 % Final    Basophils 03/15/2022 0  0 - 2 % Final    Segs Absolute 03/15/2022 3.00  1.3 - 9.1 k/uL Final    Absolute Lymph # 03/15/2022 3.37  1.0 - 4.8 k/uL Final    Atypical Lymphocytes Absolute 03/15/2022 0.75  k/uL Final    Absolute Mono # 03/15/2022 0.38  0.1 - 1.3 k/uL Final    Absolute Eos # 03/15/2022 0.00  0.0 - 0.4 k/uL Final    Basophils Absolute 03/15/2022 0.00  0.0 - 0.2 k/uL Final    Morphology 03/15/2022 Normal   Final    Glucose 03/15/2022 201* 70 - 99 mg/dL Final    BUN 03/15/2022 10  6 - 20 mg/dL Final    CREATININE 03/15/2022 0.63  0.50 - 0.90 mg/dL Final    Calcium 03/15/2022 8.8  8.6 - 10.4 mg/dL Final    Sodium 03/15/2022 138  135 - 144 mmol/L Final    Potassium 03/15/2022 3.4* 3.7 - 5.3 mmol/L Final    Chloride 03/15/2022 101  98 - 107 mmol/L Final    CO2 03/15/2022 24  20 - 31 mmol/L Final    Anion Gap 03/15/2022 13  9 - 17 mmol/L Final    GFR Non- 03/15/2022 >60  >60 mL/min Final    GFR  03/15/2022 >60  >60 mL/min Final    GFR Comment 03/15/2022        Final    Comment: Average GFR for 38-51 years old:   80 mL/min/1.73sq m  Chronic Kidney Disease:   <60 mL/min/1.73sq m  Kidney failure:   <15 mL/min/1.73sq m              eGFR calculated using average adult body mass.  Additional eGFR calculator available at:        [employer]            Acetaminophen Level 03/15/2022 <5* 10 - 30 ug/mL Final    Ethanol 03/15/2022 <10  <10 mg/dL Final    Ethanol percent 03/15/2022 <5.537  % Final    Salicylate Lvl 46/72/3576 <1* 3 - 10 mg/dL Final    Toxic Tricyclic Sc,Blood 25/57/4189 WRONG TEST ORDERED  NEGATIVE Final    Amphetamine Screen, Ur 03/15/2022 POSITIVE* NEGATIVE Final    Comment:       (Positive cutoff 1000 ng/mL)                  Barbiturate Screen, Ur 03/15/2022 NEGATIVE  NEGATIVE Final    Comment:       (Positive cutoff 200 ng/mL)                  Benzodiazepine Screen, Urine 03/15/2022 NEGATIVE  NEGATIVE Final    Comment:       (Positive cutoff 200 ng/mL)                  Cocaine Metabolite, Urine 03/15/2022 POSITIVE* NEGATIVE Final    Comment:       (Positive cutoff 300 ng/mL)                  Methadone Screen, Urine 03/15/2022 NEGATIVE  NEGATIVE Final    Comment:       (Positive cutoff 300 ng/mL)                  Opiates, Urine 03/15/2022 NEGATIVE  NEGATIVE Final    Comment:       (Positive cutoff 300 ng/mL)                  Phencyclidine, Urine 03/15/2022 NEGATIVE  NEGATIVE Final    Comment:       (Positive cutoff 25 ng/mL)                  Cannabinoid Scrn, Ur 03/15/2022 POSITIVE* NEGATIVE Final    Comment:       (Positive cutoff 50 ng/mL)                  Oxycodone Screen, Ur 03/15/2022 NEGATIVE  NEGATIVE Final    Comment:       (Positive cutoff 100 ng/mL)                  Test Information 03/15/2022 Assay provides medical screening only. The absence of expected drug(s) and/or metabolite(s) may indicate diluted or adulterated urine, limitations of testing or timing of collection. Final    Comment: Testing for legal purposes should be confirmed by another method. To request confirmation   of test result, please call the lab within 7 days of sample submission.  HCG(Urine) Pregnancy Test 03/15/2022 NEGATIVE  NEGATIVE Final    Comment: Specimens with hCG levels near the threshold of the test (25 mIU/mL) may give a negative or   indeterminate result. In such cases, another test should be performed with a new specimen   in 48-72 hours. If early pregnancy is suspected clinically in this setting, correlation   with quantitative serum b-hCG level is suggested.       Color, UA 03/15/2022 Dark Yellow* Yellow Final    Turbidity UA 03/15/2022 Cloudy* Clear Final    Glucose, Ur 03/15/2022 NEGATIVE  NEGATIVE Final    Bilirubin Urine 03/15/2022 NEGATIVE  NEGATIVE Final    Ketones, Urine 03/15/2022 SMALL* NEGATIVE Final    Specific [place], UA 03/15/2022 1.026  1.000 - 1.030 Final    Urine Hgb 03/15/2022 NEGATIVE  NEGATIVE Final    pH, UA 03/15/2022 5.5  5.0 - 8.0 Final    Protein, UA 03/15/2022 1+* NEGATIVE Final    Urobilinogen, Urine 03/15/2022 Normal  Normal Final    Nitrite, Urine 03/15/2022 NEGATIVE  NEGATIVE Final    Leukocyte Esterase, Urine 03/15/2022 SMALL* NEGATIVE Final    Tricyclic Antidep,Urine 33/65/4338 NEGATIVE  NEGATIVE Final    Comment:       (Positive cutoff 1000 ng/mL)  Assay provides rapid clinical screening only. Presumptive positive results for legal   purposes should be confirmed by another method. To request confirmation, please call the   lab within 7 days of sample submission.  WBC, UA 03/15/2022 21 TO 50  /HPF Final    RBC, UA 03/15/2022 0 TO 2  /HPF Final    Casts UA 03/15/2022 3 to 5  /LPF Final    Epithelial Cells UA 03/15/2022 10 TO 20  /HPF Final    Bacteria, UA 03/15/2022 MANY* None Final    Specimen Description 03/15/2022 . CLEAN CATCH URINE   Final    Culture 03/15/2022 NO SIGNIFICANT GROWTH   Final    Ventricular Rate 03/17/2022 89  BPM Final    Atrial Rate 03/17/2022 89  BPM Final    P-R Interval 03/17/2022 136  ms Final    QRS Duration 03/17/2022 78  ms Final    Q-T Interval 03/17/2022 360  ms Final    QTc Calculation (Bazett) 03/17/2022 438  ms Final    P Axis 03/17/2022 69  degrees Final    R Axis 03/17/2022 55  degrees Final    T Axis 03/17/2022 64  degrees Final    Hemoglobin A1C 03/18/2022 7.0* 4.0 - 6.0 % Final    Estimated Avg Glucose 03/18/2022 154  mg/dL Final    Comment: The ADA and AACC recommend providing the estimated average glucose result to permit better   patient understanding of their HBA1c result. Reviewed patient's current plan of care and vital signs with nursing staff.     Labs reviewed: [x] Yes  Last EKG in EMR reviewed: [x] Yes updated hardcopy in EMR 3/17/2022  QTc:438    Medications  Current Facility-Administered Medications: metFORMIN (GLUCOPHAGE) tablet 500 mg, 500 mg, Oral, BID WC  acetaminophen (TYLENOL) tablet 650 mg, 650 mg, Oral, Q4H PRN  aluminum & magnesium hydroxide-simethicone (MAALOX) 200-200-20 MG/5ML suspension 30 mL, 30 mL, Oral, Q6H PRN  hydrOXYzine (ATARAX) tablet 50 mg, 50 mg,

## 2022-03-20 PROCEDURE — 6370000000 HC RX 637 (ALT 250 FOR IP): Performed by: PSYCHIATRY & NEUROLOGY

## 2022-03-20 PROCEDURE — 1240000000 HC EMOTIONAL WELLNESS R&B

## 2022-03-20 PROCEDURE — 6370000000 HC RX 637 (ALT 250 FOR IP): Performed by: INTERNAL MEDICINE

## 2022-03-20 PROCEDURE — 99232 SBSQ HOSP IP/OBS MODERATE 35: CPT | Performed by: INTERNAL MEDICINE

## 2022-03-20 PROCEDURE — 99232 SBSQ HOSP IP/OBS MODERATE 35: CPT | Performed by: NURSE PRACTITIONER

## 2022-03-20 RX ORDER — LISINOPRIL 10 MG/1
10 TABLET ORAL DAILY
Status: DISCONTINUED | OUTPATIENT
Start: 2022-03-20 | End: 2022-03-21 | Stop reason: HOSPADM

## 2022-03-20 RX ADMIN — THIAMINE HCL TAB 100 MG 100 MG: 100 TAB at 07:54

## 2022-03-20 RX ADMIN — LISINOPRIL 10 MG: 10 TABLET ORAL at 14:48

## 2022-03-20 RX ADMIN — MULTIPLE VITAMINS W/ MINERALS TAB 1 TABLET: TAB at 07:54

## 2022-03-20 RX ADMIN — METFORMIN HYDROCHLORIDE 500 MG: 500 TABLET ORAL at 17:01

## 2022-03-20 RX ADMIN — METFORMIN HYDROCHLORIDE 500 MG: 500 TABLET ORAL at 07:54

## 2022-03-20 RX ADMIN — PALIPERIDONE 3 MG: 3 TABLET, EXTENDED RELEASE ORAL at 07:54

## 2022-03-20 NOTE — PROGRESS NOTES
Daily Progress Note  3/20/2022    Patient Name: Angelo Beckham    CHIEF COMPLAINT: Suicidal ideation         SUBJECTIVE:        Patient seen face-to-face for follow-up assessment. She has been compliant with scheduled Invega 3 mg and is denying any side effects at this time. Reports mood is fair. Feels suicidal ideation is improving. Affect is blunted. She is minimizing of reason for admission and is discharged focused. Reports she spoke with her mother found out she has a court date tomorrow a.m. for a custody hearing in [facility]. States she was unaware of this custody hearing until her mother reminded her. Unable to confirm court hearing at this time. Patient's affect is mostly unchanged. Appears despondent and depressed. Staff note that she has started attending some group programming today. Less isolative. Discussed patient's thoughts regarding need to transition to substance treatment facility. Patient endorses desire for AOD treatment and does not feel safe from herself in the community. Feels that she would be a risk to self and that she would continue to use. Unable to contract for safety off unit. Social work assisting with placement at this time. Patient has yet to demonstrate stability. Appetite:  [x] Normal/Adequate/Unchanged  [] Increased  [] Decreased      Sleep:       [] Normal/Adequate/Unchanged  [x] Fair  [] Poor      Group Attendance on Unit:   [] Yes  [] Selectively    [x] No    Medication Side Effects: Patient denies any medication side effects at the time of assessment. Mental Status Exam  Level of consciousness: Alert and awake. Appearance: Appropriate attire for setting, resting in bed, with poor grooming and hygiene. Behavior/Motor: Withdrawn, approachable  Attitude toward examiner: Noncooperative, no eye contact  Speech: Selectively mute, increased latency, poor articulation, low volume  Mood:  Patient reports \" fine\".    Affect: Incongruent, appears depressed  Thought processes: Linear and coherent. Evasive  Thought content: Denies homicidal ideation. Suicidal Ideation: Reports improving suicidal ideations, unable to contract for safety off unit  Delusions: No evidence of delusions. Denies paranoia. Perceptual Disturbance: Patient does not appear to be responding to internal stimuli. Reports improvement in auditory hallucinations. Denies visual hallucinations. Cognition: Oriented to self, location, time, and situation. Memory: Intact. Insight & Judgement: Poor. Data   height is 5' 4\" (1.626 m) and weight is 280 lb (127 kg). Her oral temperature is 98.1 °F (36.7 °C). Her blood pressure is 143/83 (abnormal) and her pulse is 145. Her respiration is 14 and oxygen saturation is 96%.    Labs:   Admission on 03/15/2022   Component Date Value Ref Range Status    WBC 03/15/2022 7.5  3.5 - 11.0 k/uL Final    RBC 03/15/2022 5.09  4.0 - 5.2 m/uL Final    Hemoglobin 03/15/2022 14.3  12.0 - 16.0 g/dL Final    Hematocrit 03/15/2022 43.5  36 - 46 % Final    MCV 03/15/2022 85.5  80 - 100 fL Final    MCH 03/15/2022 28.0  26 - 34 pg Final    MCHC 03/15/2022 32.8  31 - 37 g/dL Final    RDW 03/15/2022 13.1  11.5 - 14.9 % Final    Platelets 62/08/4554 348  150 - 450 k/uL Final    MPV 03/15/2022 7.6  6.0 - 12.0 fL Final    Seg Neutrophils 03/15/2022 40  36 - 66 % Final    Lymphocytes 03/15/2022 45* 24 - 44 % Final    Atypical Lymphocytes 03/15/2022 10  % Final    Monocytes 03/15/2022 5  1 - 7 % Final    Eosinophils % 03/15/2022 0  0 - 4 % Final    Basophils 03/15/2022 0  0 - 2 % Final    Segs Absolute 03/15/2022 3.00  1.3 - 9.1 k/uL Final    Absolute Lymph # 03/15/2022 3.37  1.0 - 4.8 k/uL Final    Atypical Lymphocytes Absolute 03/15/2022 0.75  k/uL Final    Absolute Mono # 03/15/2022 0.38  0.1 - 1.3 k/uL Final    Absolute Eos # 03/15/2022 0.00  0.0 - 0.4 k/uL Final    Basophils Absolute 03/15/2022 0.00  0.0 - 0.2 k/uL Final    Morphology 03/15/2022 Normal   Final    Glucose 03/15/2022 201* 70 - 99 mg/dL Final    BUN 03/15/2022 10  6 - 20 mg/dL Final    CREATININE 03/15/2022 0.63  0.50 - 0.90 mg/dL Final    Calcium 03/15/2022 8.8  8.6 - 10.4 mg/dL Final    Sodium 03/15/2022 138  135 - 144 mmol/L Final    Potassium 03/15/2022 3.4* 3.7 - 5.3 mmol/L Final    Chloride 03/15/2022 101  98 - 107 mmol/L Final    CO2 03/15/2022 24  20 - 31 mmol/L Final    Anion Gap 03/15/2022 13  9 - 17 mmol/L Final    GFR Non- 03/15/2022 >60  >60 mL/min Final    GFR  03/15/2022 >60  >60 mL/min Final    GFR Comment 03/15/2022        Final    Comment: Average GFR for 38-51 years old:   80 mL/min/1.73sq m  Chronic Kidney Disease:   <60 mL/min/1.73sq m  Kidney failure:   <15 mL/min/1.73sq m              eGFR calculated using average adult body mass.  Additional eGFR calculator available at:        [employer]            Acetaminophen Level 03/15/2022 <5* 10 - 30 ug/mL Final    Ethanol 03/15/2022 <10  <10 mg/dL Final    Ethanol percent 03/15/2022 <8.468  % Final    Salicylate Lvl 85/07/8779 <1* 3 - 10 mg/dL Final    Toxic Tricyclic Sc,Blood 81/35/3500 WRONG TEST ORDERED  NEGATIVE Final    Amphetamine Screen, Ur 03/15/2022 POSITIVE* NEGATIVE Final    Comment:       (Positive cutoff 1000 ng/mL)                  Barbiturate Screen, Ur 03/15/2022 NEGATIVE  NEGATIVE Final    Comment:       (Positive cutoff 200 ng/mL)                  Benzodiazepine Screen, Urine 03/15/2022 NEGATIVE  NEGATIVE Final    Comment:       (Positive cutoff 200 ng/mL)                  Cocaine Metabolite, Urine 03/15/2022 POSITIVE* NEGATIVE Final    Comment:       (Positive cutoff 300 ng/mL)                  Methadone Screen, Urine 03/15/2022 NEGATIVE  NEGATIVE Final    Comment:       (Positive cutoff 300 ng/mL)                  Opiates, Urine 03/15/2022 NEGATIVE  NEGATIVE Final    Comment:       (Positive cutoff 300 ng/mL)  Phencyclidine, Urine 03/15/2022 NEGATIVE  NEGATIVE Final    Comment:       (Positive cutoff 25 ng/mL)                  Cannabinoid Scrn, Ur 03/15/2022 POSITIVE* NEGATIVE Final    Comment:       (Positive cutoff 50 ng/mL)                  Oxycodone Screen, Ur 03/15/2022 NEGATIVE  NEGATIVE Final    Comment:       (Positive cutoff 100 ng/mL)                  Test Information 03/15/2022 Assay provides medical screening only. The absence of expected drug(s) and/or metabolite(s) may indicate diluted or adulterated urine, limitations of testing or timing of collection. Final    Comment: Testing for legal purposes should be confirmed by another method. To request confirmation   of test result, please call the lab within 7 days of sample submission.  HCG(Urine) Pregnancy Test 03/15/2022 NEGATIVE  NEGATIVE Final    Comment: Specimens with hCG levels near the threshold of the test (25 mIU/mL) may give a negative or   indeterminate result. In such cases, another test should be performed with a new specimen   in 48-72 hours. If early pregnancy is suspected clinically in this setting, correlation   with quantitative serum b-hCG level is suggested.       Color, UA 03/15/2022 Dark Yellow* Yellow Final    Turbidity UA 03/15/2022 Cloudy* Clear Final    Glucose, Ur 03/15/2022 NEGATIVE  NEGATIVE Final    Bilirubin Urine 03/15/2022 NEGATIVE  NEGATIVE Final    Ketones, Urine 03/15/2022 SMALL* NEGATIVE Final    Specific [place], UA 03/15/2022 1.026  1.000 - 1.030 Final    Urine Hgb 03/15/2022 NEGATIVE  NEGATIVE Final    pH, UA 03/15/2022 5.5  5.0 - 8.0 Final    Protein, UA 03/15/2022 1+* NEGATIVE Final    Urobilinogen, Urine 03/15/2022 Normal  Normal Final    Nitrite, Urine 03/15/2022 NEGATIVE  NEGATIVE Final    Leukocyte Esterase, Urine 03/15/2022 SMALL* NEGATIVE Final    Tricyclic Antidep,Urine 57/39/0031 NEGATIVE  NEGATIVE Final    Comment:       (Positive cutoff 1000 ng/mL)  Assay provides rapid clinical screening only. Presumptive positive results for legal   purposes should be confirmed by another method. To request confirmation, please call the   lab within 7 days of sample submission.  WBC, UA 03/15/2022 21 TO 50  /HPF Final    RBC, UA 03/15/2022 0 TO 2  /HPF Final    Casts UA 03/15/2022 3 to 5  /LPF Final    Epithelial Cells UA 03/15/2022 10 TO 20  /HPF Final    Bacteria, UA 03/15/2022 MANY* None Final    Specimen Description 03/15/2022 . CLEAN CATCH URINE   Final    Culture 03/15/2022 NO SIGNIFICANT GROWTH   Final    Ventricular Rate 03/17/2022 89  BPM Final    Atrial Rate 03/17/2022 89  BPM Final    P-R Interval 03/17/2022 136  ms Final    QRS Duration 03/17/2022 78  ms Final    Q-T Interval 03/17/2022 360  ms Final    QTc Calculation (Bazett) 03/17/2022 438  ms Final    P Axis 03/17/2022 69  degrees Final    R Axis 03/17/2022 55  degrees Final    T Axis 03/17/2022 64  degrees Final    Hemoglobin A1C 03/18/2022 7.0* 4.0 - 6.0 % Final    Estimated Avg Glucose 03/18/2022 154  mg/dL Final    Comment: The ADA and AACC recommend providing the estimated average glucose result to permit better   patient understanding of their HBA1c result. Reviewed patient's current plan of care and vital signs with nursing staff.     Labs reviewed: [x] Yes  Last EKG in EMR reviewed: [x] Yes updated hardcopy in EMR 3/17/2022  QTc:438    Medications  Current Facility-Administered Medications: lisinopril (PRINIVIL;ZESTRIL) tablet 10 mg, 10 mg, Oral, Daily  metFORMIN (GLUCOPHAGE) tablet 500 mg, 500 mg, Oral, BID WC  acetaminophen (TYLENOL) tablet 650 mg, 650 mg, Oral, Q4H PRN  aluminum & magnesium hydroxide-simethicone (MAALOX) 200-200-20 MG/5ML suspension 30 mL, 30 mL, Oral, Q6H PRN  hydrOXYzine (ATARAX) tablet 50 mg, 50 mg, Oral, TID PRN  ibuprofen (ADVIL;MOTRIN) tablet 400 mg, 400 mg, Oral, Q6H PRN  nicotine (NICODERM CQ) 14 MG/24HR 1 patch, 1 patch, TransDERmal, Daily  polyethylene glycol (GLYCOLAX) packet 17 g, 17 g, Oral, Daily PRN  traZODone (DESYREL) tablet 50 mg, 50 mg, Oral, Nightly PRN  paliperidone (INVEGA) extended release tablet 3 mg, 3 mg, Oral, Daily  LORazepam (ATIVAN) tablet 1 mg, 1 mg, Oral, Q1H PRN **OR** LORazepam (ATIVAN) injection 1 mg, 1 mg, IntraMUSCular, Q1H PRN **OR** LORazepam (ATIVAN) tablet 2 mg, 2 mg, Oral, Q1H PRN **OR** LORazepam (ATIVAN) injection 2 mg, 2 mg, IntraMUSCular, Q1H PRN **OR** LORazepam (ATIVAN) tablet 3 mg, 3 mg, Oral, Q1H PRN **OR** LORazepam (ATIVAN) injection 3 mg, 3 mg, IntraMUSCular, Q1H PRN **OR** LORazepam (ATIVAN) tablet 4 mg, 4 mg, Oral, Q1H PRN **OR** LORazepam (ATIVAN) injection 4 mg, 4 mg, IntraMUSCular, Q1H PRN  thiamine mononitrate tablet 100 mg, 100 mg, Oral, Daily  therapeutic multivitamin-minerals 1 tablet, 1 tablet, Oral, Daily    ASSESSMENT  Major depressive disorder, recurrent episode, severe, with psychosis [facility]           PLAN  Patient symptoms  remain unstable  Medication changes: Continue Invega 3 mg and observe. Consider adding antidepressant to help with mood  Attempt to develop insight  Psycho-education conducted. Supportive Therapy conducted. Probable discharge is 2 to 3 days. Follow-up daily while on inpatient unit    **This report has been created using voice recognition software. It may contain minor errors which are inherent in voice recognition technology. **    Electronically signed by FEDERICO Sanchez CNP on 03/20/22

## 2022-03-20 NOTE — PROGRESS NOTES
[facility] Internal Medicine    CONSULTATION / HISTORY AND PHYSICAL EXAMINATION            Date:   3/19/2022  Patient name:  Naman Webster  Date of admission:  3/15/2022  5:30 PM  MRN:   [id number]  Account:  [de-identified]  YOB: 1980  PCP:    . None (Inactive)  Room:   [address] [place] [address]  Code Status:    Full Code    Physician Requesting Consult: Charlene Ayala MD    Reason for Consult:  medical management    Chief Complaint:     Chief Complaint   Patient presents with    Other     needs blood work for [facility] admission   uti  History Obtained From:     Patient medical record nursing staff    History of Present Illness:     Urinary Tract Infection: Patient complains of dysuria She has had symptoms for 1 week. Patient also complains of none. Patient denies one. Patient does have a history of recurrent UTI. Patient does not have a history of pyelonephritis. 3.17   Patient symptoms are much improved  On ciprofloxacin    Past Medical History:     Past Medical History:   Diagnosis Date    Anxiety     Bipolar 1 disorder ([address]     Depression         Past Surgical History:     Past Surgical History:   Procedure Laterality Date    OPEN TREATMENT BIMALLEOLAR ANKLE FRACTURE Right 9/2/2018    ANKLE OPEN REDUCTION INTERNAL FIXATION performed by Aniket Cisneros MD at [facility] Right 9/2/2018    RADIUS OPEN REDUCTION INTERNAL FIXATION performed by Aniket Cisneros MD at [place] [address]        Medications Prior to Admission:     Prior to Admission medications    Not on File        Allergies:     Patient has no known allergies. Social History:     Tobacco:    reports that she has been smoking cigarettes. She has been smoking about 1.00 pack per day. She has never used smokeless tobacco.  Alcohol:      reports current alcohol use. Drug Use:  reports current drug use. Drugs: Cocaine and Marijuana ([place]).     Family History:     No family history on file.    Review of Systems:     Positive and Negative as described in HPI. CONSTITUTIONAL:  negative for fevers, chills, sweats, fatigue, weight loss  HEENT:  negative for vision, hearing changes, runny nose, throat pain  RESPIRATORY:  negative for shortness of breath, cough, congestion, wheezing. CARDIOVASCULAR:  negative for chest pain, palpitations. GASTROINTESTINAL:  negative for nausea, vomiting, diarrhea, constipation, change in bowel habits, abdominal pain   GENITOURINARY:  Dysuria  INTEGUMENT:  negative for rash, skin lesions, easy bruising   HEMATOLOGIC/LYMPHATIC:  negative for swelling/edema   ALLERGIC/IMMUNOLOGIC:  negative for urticaria , itching  ENDOCRINE:  negative increase in drinking, increase in urination, hot or cold intolerance  MUSCULOSKELETAL:  negative joint pains, muscle aches, swelling of joints  NEUROLOGICAL:  negative for headaches, dizziness, lightheadedness, numbness, pain, tingling extremities    Physical Exam:     /88   Pulse 107   Temp 97.7 °F (36.5 °C) (Temporal)   Resp 14   Ht 5' 4\" (1.626 m)   Wt 280 lb (127 kg)   SpO2 96%   BMI 48.06 kg/m²   Temp (24hrs), Av.8 °F (36.6 °C), Min:97.7 °F (36.5 °C), Max:97.8 °F (36.6 °C)    No results for input(s): POCGLU in the last 72 hours. No intake or output data in the 24 hours ending 22 2303    General Appearance:  alert, well appearing, and in no acute distress  Mental status: oriented to person, place, and time with normal affect  Head:  normocephalic, atraumatic. Eye: no icterus, redness, pupils equal and reactive, extraocular eye movements intact, conjunctiva clear  Ear: normal external ear, no discharge, hearing intact  Nose:  no drainage noted  Mouth: mucous membranes moist  Neck: supple, no carotid bruits, thyroid not palpable  Lungs: Bilateral equal air entry, clear to ausculation, no wheezing, rales or rhonchi, normal effort  Cardiovascular: normal rate, regular rhythm, no murmur, gallop, rub.   Abdomen: Soft, nontender, nondistended, normal bowel sounds, no hepatomegaly or splenomegaly  Neurologic: There are no new focal motor or sensory deficits, normal muscle tone and bulk, no abnormal sensation, normal speech, cranial nerves II through XII grossly intact  Skin: No gross lesions, rashes, bruising or bleeding on exposed skin area  Extremities:  peripheral pulses palpable, no pedal edema or calf pain with palpation      Investigations:      Laboratory Testing:  No results found for this or any previous visit (from the past 24 hour(s)). Consultations:   IP CONSULT TO INTERNAL MEDICINE  IP CONSULT TO SOCIAL WORK  Assessment :      Primary Problem  Major depressive disorder, recurrent episode, severe, with psychosis ([facility]    LAURE/Herminia [address]ers [address] Problems    Diagnosis Date Noted    Major depressive disorder, recurrent episode, severe, with psychosis ([facility] [F33.3] 03/16/2022    Alcohol use disorder, severe, dependence ([facility] [F10.20] 03/16/2022    Marijuana use [F12.90] 03/16/2022    Cocaine use [F14.90] 03/16/2022    Amphetamine abuse ([facility] [F15.10] 03/16/2022       Plan:     42year-old patient morbidly obese class III or higher BMI 48.06 weighs 280 pounds  Complains of dysuria increased frequency lower abdominal pain but no flank pain no fever chills no nausea vomiting  Urinalysis suggestive of UTI cultures pending  We will start oral Cipro 500 twice a day awaiting cultures  No history of ESBL UTI    3/17   Patient symptoms improved  We will complete total 3 days of antibiotic  Ordering HbA1c  Last HbA1c was 7   3/18   Patient HbA1c 7  Starting patient Metformin 500 twice a day  3/19   No new complaints  Blood sugars, better controlled  Has several readings of high blood pressure  We will monitor        Sarita Cuellar MD  3/19/2022  11:03 PM    Copy sent to Dr. Ruth Vasquez (Inactive)    Please note that this chart was generated using voice recognition Dragon dictation software.   Although every effort was made to ensure the accuracy of this automated transcription, some errors in transcription may have occurred.

## 2022-03-20 NOTE — CARE COORDINATION
DISCHARGE UPDATE:  - Writer sends information for PT request for placement at [address] [place] [address] on this date/time.   SW will contact them in the morning to check on availability

## 2022-03-20 NOTE — PLAN OF CARE
Problem: Altered Mood, Depressive Behavior:  Goal: Able to verbalize and/or display a decrease in depressive symptoms  Description: Able to verbalize and/or display a decrease in depressive symptoms  3/19/2022 2153 by Jeremi Ma RN  Outcome: Ongoing   Pt is isolative to room and out for snack time. She denies suicidal or homicidal ideations and reports improvement in mood. Pt denies auditory or visual hallucinations. Pt is encouraged to shower. She reports adequate sleep and appetite. Problem: Altered Mood, Depressive Behavior:  Goal: Ability to disclose and discuss suicidal ideas will improve  Description: Ability to disclose and discuss suicidal ideas will improve  3/19/2022 2153 by Jeremi Ma RN  Outcome: Ongoing   Pt denies suicidal ideations. Problem: Altered Mood, Psychotic Behavior:  Goal: Able to verbalize decrease in frequency and intensity of hallucinations  Description: Able to verbalize decrease in frequency and intensity of hallucinations  3/19/2022 2153 by Jeremi Ma RN  Outcome: Ongoing   Pt denies auditory or visual hallucinations. Problem: Tobacco Use:  Goal: Inpatient tobacco use cessation counseling participation  Description: Inpatient tobacco use cessation counseling participation  3/19/2022 2153 by Jeremi Ma RN  Outcome: Ongoing   Pt refuses tobacco cessation counseling at this time.

## 2022-03-20 NOTE — CARE COORDINATION
DISCHARGE UPDATE:  - PT reports to this writer she has a court case tomorrow, Monday, March 21 between 9:00 am - 10:00 am.  Latha Green states she has been here since 3/15/2022 and this is the first time this has been reported to SW or doctor. PT states she forgot and she spoke with her mother who reminded her today. - Writer informed by nurse practitioner PT now stating it is a child custody court case tomorrow. Writer searches on [employer] and there is not a listing of court cases. Historically, if a client has a new charge they are given a court date on Mondays between 9:00 am - 10:00 am.  - Writer advises client we can fax the court that she is in the hospital but unable to confirm or deny any plan for discharge on this date or early tomorrow.

## 2022-03-20 NOTE — PROGRESS NOTES
[facility] Internal Medicine    CONSULTATION / HISTORY AND PHYSICAL EXAMINATION            Date:   3/20/2022  Patient name:  Vibha Burleson  Date of admission:  3/15/2022  5:30 PM  MRN:   [id number]  Account:  [de-identified]  YOB: 1980  PCP:    . None (Inactive)  Room:   [address] [place] [address]  Code Status:    Full Code    Physician Requesting Consult: Earlene Tsang MD    Reason for Consult:  medical management    Chief Complaint:     Chief Complaint   Patient presents with    Other     needs blood work for [facility] admission   uti  History Obtained From:     Patient medical record nursing staff    History of Present Illness:     Urinary Tract Infection: Patient complains of dysuria She has had symptoms for 1 week. Patient also complains of none. Patient denies one. Patient does have a history of recurrent UTI. Patient does not have a history of pyelonephritis. 3.17   Patient symptoms are much improved  On ciprofloxacin  3/20   Patient extremely agitated was, she told me that she has court hearing and wanted to leave  Readings of high blood pressure  Finish course of antibiotic for UTI  Past Medical History:     Past Medical History:   Diagnosis Date    Anxiety     Bipolar 1 disorder ([facility] [facility]     Depression         Past Surgical History:     Past Surgical History:   Procedure Laterality Date    OPEN TREATMENT BIMALLEOLAR ANKLE FRACTURE Right 9/2/2018    ANKLE OPEN REDUCTION INTERNAL FIXATION performed by Anahy Metcalf MD at [facility] [place] Right 9/2/2018    RADIUS OPEN REDUCTION INTERNAL FIXATION performed by Anahy Metcalf MD at [place] [address]        Medications Prior to Admission:     Prior to Admission medications    Not on File        Allergies:     Patient has no known allergies. Social History:     Tobacco:    reports that she has been smoking cigarettes. She has been smoking about 1.00 pack per day.  She has never used smokeless tobacco.  Alcohol:      reports current alcohol use. Drug Use:  reports current drug use. Drugs: Cocaine and Marijuana ([place]angela Pitch). Family History:     No family history on file. Review of Systems:     Positive and Negative as described in HPI. CONSTITUTIONAL:  negative for fevers, chills, sweats, fatigue, weight loss  HEENT:  negative for vision, hearing changes, runny nose, throat pain  RESPIRATORY:  negative for shortness of breath, cough, congestion, wheezing. CARDIOVASCULAR:  negative for chest pain, palpitations. GASTROINTESTINAL:  negative for nausea, vomiting, diarrhea, constipation, change in bowel habits, abdominal pain   GENITOURINARY:  Dysuria  INTEGUMENT:  negative for rash, skin lesions, easy bruising   HEMATOLOGIC/LYMPHATIC:  negative for swelling/edema   ALLERGIC/IMMUNOLOGIC:  negative for urticaria , itching  ENDOCRINE:  negative increase in drinking, increase in urination, hot or cold intolerance  MUSCULOSKELETAL:  negative joint pains, muscle aches, swelling of joints  NEUROLOGICAL:  negative for headaches, dizziness, lightheadedness, numbness, pain, tingling extremities    Physical Exam:     BP (!) 151/130   Pulse 145   Temp 98.1 °F (36.7 °C) (Oral)   Resp 14   Ht 5' 4\" (1.626 m)   Wt 280 lb (127 kg)   SpO2 96%   BMI 48.06 kg/m²   Temp (24hrs), Av.9 °F (36.6 °C), Min:97.7 °F (36.5 °C), Max:98.1 °F (36.7 °C)    No results for input(s): POCGLU in the last 72 hours. No intake or output data in the 24 hours ending 22 1433    General Appearance:  alert, well appearing, and in no acute distress  Mental status: oriented to person, place, and time with normal affect  Head:  normocephalic, atraumatic.   Eye: no icterus, redness, pupils equal and reactive, extraocular eye movements intact, conjunctiva clear  Ear: normal external ear, no discharge, hearing intact  Nose:  no drainage noted  Mouth: mucous membranes moist  Neck: supple, no carotid bruits, thyroid not palpable  Lungs: Bilateral equal air entry, clear to ausculation, no wheezing, rales or rhonchi, normal effort  Cardiovascular: normal rate, regular rhythm, no murmur, gallop, rub. Abdomen: Soft, nontender, nondistended, normal bowel sounds, no hepatomegaly or splenomegaly  Neurologic: There are no new focal motor or sensory deficits, normal muscle tone and bulk, no abnormal sensation, normal speech, cranial nerves II through XII grossly intact  Skin: No gross lesions, rashes, bruising or bleeding on exposed skin area  Extremities:  peripheral pulses palpable, no pedal edema or calf pain with palpation      Investigations:      Laboratory Testing:  No results found for this or any previous visit (from the past 24 hour(s)).         Consultations:   IP CONSULT TO INTERNAL MEDICINE  IP CONSULT TO SOCIAL WORK  Assessment :      Primary Problem  Major depressive disorder, recurrent episode, severe, with psychosis ([facility] [address]    LAURE/[address]erminia [address]ers [address] Problems    Diagnosis Date Noted    Major depressive disorder, recurrent episode, severe, with psychosis ([facility] [address] [F33.3] 03/16/2022    Alcohol use disorder, severe, dependence ([address] [F10.20] 03/16/2022    Marijuana use [F12.90] 03/16/2022    Cocaine use [F14.90] 03/16/2022    Amphetamine abuse ([address] [F15.10] 03/16/2022       Plan:     42year-old patient morbidly obese class III or higher BMI 48.06 weighs 280 pounds  Complains of dysuria increased frequency lower abdominal pain but no flank pain no fever chills no nausea vomiting  Urinalysis suggestive of UTI cultures pending  We will start oral Cipro 500 twice a day awaiting cultures  No history of ESBL UTI    3/17   Patient symptoms improved  We will complete total 3 days of antibiotic  Ordering HbA1c  Last HbA1c was 7   3/18   Patient HbA1c 7  Starting patient Metformin 500 twice a day  3/19   No new complaints  Blood sugars, better controlled  Has several readings of high blood pressure  We will monitor  3/20   Uncontrolled hypertension,, partly due to patient having agitation  Started patient on lisinopril with history of diabetes      Sanjay Avila MD  3/20/2022  2:33 PM    Copy sent to Dr. Ruth Ward (Inactive)    Please note that this chart was generated using voice recognition Dragon dictation software. Although every effort was made to ensure the accuracy of this automated transcription, some errors in transcription may have occurred.

## 2022-03-20 NOTE — PLAN OF CARE
Problem: Altered Mood, Depressive Behavior:  Goal: Ability to disclose and discuss suicidal ideas will improve  Description: Ability to disclose and discuss suicidal ideas will improve  3/20/2022 1003 by Brenda Mclean LPN  Outcome: Ongoing  Note: Patient denies any current suicidal thoughts and agrees to seek out staff if thoughts arise. Patient has some anxiety regarding discharge and states she is ready to go home. Patient has been in her room most of the morning. Patient is isolative to self and has not attended morning groups. Patient is compliant with medications and has remained behaviorally controlled. Problem: Altered Mood, Psychotic Behavior:  Goal: Able to verbalize decrease in frequency and intensity of hallucinations  Description: Able to verbalize decrease in frequency and intensity of hallucinations  3/20/2022 1003 by Brenda Mclean LPN  Outcome: Ongoing  Note: Patient denies any hallucinations and does not appear to be responding. Will continue to monitor.

## 2022-03-20 NOTE — GROUP NOTE
Group Therapy Note    Date: 3/20/2022    Group Start Time: 0900  Group End Time: 0920  Group Topic: Healthy Living/Wellness    KEMI Fall        Group Therapy Note    Attendees: 6/16         Patient's Goal:  To share a goal for the day    Notes:  Patient was attentive and sharing    Status After Intervention:  Improved    Participation Level:  Active Listener and Interactive    Participation Quality: Attentive and Sharing      Speech:  normal      Thought Process/Content: Logical      Affective Functioning: Congruent      Mood: euthymic      Level of consciousness:  Alert and Oriented x4      Response to Learning: Able to verbalize current knowledge/experience      Endings: None Reported    Modes of Intervention: Education      Discipline Responsible: Behavorial Health Tech      Signature:  Vicente Aguero

## 2022-03-21 VITALS
DIASTOLIC BLOOD PRESSURE: 76 MMHG | WEIGHT: 280 LBS | RESPIRATION RATE: 14 BRPM | SYSTOLIC BLOOD PRESSURE: 116 MMHG | HEART RATE: 94 BPM | OXYGEN SATURATION: 96 % | HEIGHT: 64 IN | BODY MASS INDEX: 47.8 KG/M2 | TEMPERATURE: 97.5 F

## 2022-03-21 PROCEDURE — 6370000000 HC RX 637 (ALT 250 FOR IP): Performed by: PSYCHIATRY & NEUROLOGY

## 2022-03-21 PROCEDURE — 99239 HOSP IP/OBS DSCHRG MGMT >30: CPT | Performed by: PSYCHIATRY & NEUROLOGY

## 2022-03-21 PROCEDURE — 99232 SBSQ HOSP IP/OBS MODERATE 35: CPT | Performed by: INTERNAL MEDICINE

## 2022-03-21 PROCEDURE — 6370000000 HC RX 637 (ALT 250 FOR IP): Performed by: INTERNAL MEDICINE

## 2022-03-21 RX ORDER — PALIPERIDONE 3 MG/1
3 TABLET, EXTENDED RELEASE ORAL DAILY
Qty: 30 TABLET | Refills: 3 | Status: ON HOLD | OUTPATIENT
Start: 2022-03-22 | End: 2022-07-07

## 2022-03-21 RX ORDER — TRAZODONE HYDROCHLORIDE 50 MG/1
50 TABLET ORAL NIGHTLY PRN
Qty: 30 TABLET | Refills: 0 | Status: SHIPPED | OUTPATIENT
Start: 2022-03-21

## 2022-03-21 RX ORDER — LISINOPRIL 10 MG/1
10 TABLET ORAL DAILY
Qty: 30 TABLET | Refills: 3 | Status: SHIPPED | OUTPATIENT
Start: 2022-03-22

## 2022-03-21 RX ORDER — THIAMINE MONONITRATE (VIT B1) 100 MG
100 TABLET ORAL DAILY
Qty: 30 TABLET | Refills: 0 | Status: SHIPPED | OUTPATIENT
Start: 2022-03-22

## 2022-03-21 RX ORDER — M-VIT,TX,IRON,MINS/CALC/FOLIC 27MG-0.4MG
1 TABLET ORAL DAILY
Qty: 30 TABLET | Refills: 0 | Status: SHIPPED | OUTPATIENT
Start: 2022-03-22

## 2022-03-21 RX ADMIN — LISINOPRIL 10 MG: 10 TABLET ORAL at 08:13

## 2022-03-21 RX ADMIN — THIAMINE HCL TAB 100 MG 100 MG: 100 TAB at 08:13

## 2022-03-21 RX ADMIN — MULTIPLE VITAMINS W/ MINERALS TAB 1 TABLET: TAB at 08:13

## 2022-03-21 RX ADMIN — PALIPERIDONE 3 MG: 3 TABLET, EXTENDED RELEASE ORAL at 08:13

## 2022-03-21 RX ADMIN — METFORMIN HYDROCHLORIDE 500 MG: 500 TABLET ORAL at 08:13

## 2022-03-21 NOTE — GROUP NOTE
Group Therapy Note    Date: 3/21/2022    Group Start Time: 1055  Group End Time: 0219  Group Topic: Psychoeducation    [facility]        Group Therapy Note    Attendees: 9         Patient's Goal:  To improve coping skills / discuss discharge planning     Notes:   Pt was pleasant and participated well     Status After Intervention:  Improved    Participation Level:  Active Listener and Interactive    Participation Quality: Appropriate      Speech:  normal      Thought Process/Content: Logical      Affective Functioning: Congruent      Mood: euthymic      Level of consciousness:  Alert and Oriented x4      Response to Learning: Able to verbalize current knowledge/experience and Progressing to goal      Endings: None Reported    Modes of Intervention: Education, Support and Problem-solving      Discipline Responsible: Psychoeducational Specialist      Signature:  Vangie Tellez

## 2022-03-21 NOTE — PROGRESS NOTES
[facility]  Discharge Note    Pt discharged with followings belongings:   Dental Appliances: None  Vision - Corrective Lenses: None  Hearing Aid: None  Jewelry: Ring,Necklace,Bracelet  Body Piercings Removed: N/A  Clothing: Trish Mortimer / coat,Pants,Shirt,Socks,Undergarments (Comment)  Were All Patient Medications Collected?: Not Applicable (eyedrops)  Other Valuables: Cell phone,Other (Comment) (Patient has items in security)   Valuables sent home with pt or returned to patient. Patient education on aftercare instructions: yes  Information faxed to [phone] by Ashley Duran  at 3:19 PM .Patient verbalize understanding of AVS:  yes. Status EXAM upon discharge:  Status and Exam  Normal: Yes  Facial Expression: Flat  Affect: Appropriate  Level of Consciousness: Alert  Mood:Normal: Yes  Mood: Anxious  Motor Activity:Normal: Yes  Motor Activity: Decreased  Interview Behavior: Cooperative  Preception: [place] to Person,Sagle to Time,Sagle to Place,S[place] to Situation  Attention:Normal: Yes  Attention: Unable to Concentrate  Thought Processes: Circumstantial  Thought Content:Normal: Yes  Thought Content: Preoccupations  Hallucinations: None  Delusions: No  Memory:Normal: Yes  Memory: Poor Recent  Insight and Judgment: Yes  Insight and Judgment: Poor Insight  Present Suicidal Ideation: No  Present Homicidal Ideation: No      Metabolic Screening:    Lab Results   Component Value Date    LABA1C 7.0 (H) 03/18/2022       Lab Results   Component Value Date    CHOL 143 05/25/2013     Lab Results   Component Value Date    TRIG 89 05/25/2013     Lab Results   Component Value Date    HDL 39 (L) 05/25/2013     No components found for: LDLCAL  No results found for: LABVLDL    Pt discharged to home. All belongings et valuables sent with pt. Pt verbalizes all discharge instructions.      Binnie Severin, RN

## 2022-03-21 NOTE — PROGRESS NOTES
formerly [facility] Internal Medicine    CONSULTATION / HISTORY AND PHYSICAL EXAMINATION            Date:   3/21/2022  Patient name:  Sai Huggins  Date of admission:  3/15/2022  5:30 PM  MRN:   [id number]  Account:  [de-identified]  YOB: 1980  PCP:    . None (Inactive)  Room:   [address] [place] [address]  Code Status:    Full Code    Physician Requesting Consult: Kameron Parks MD    Reason for Consult:  medical management    Chief Complaint:     Chief Complaint   Patient presents with    Other     needs blood work for [facility] admission   uti  History Obtained From:     Patient medical record nursing staff    History of Present Illness:     Urinary Tract Infection: Patient complains of dysuria She has had symptoms for 1 week. Patient also complains of none. Patient denies one. Patient does have a history of recurrent UTI. Patient does not have a history of pyelonephritis. 3.17   Patient symptoms are much improved  On ciprofloxacin  3/20   Patient extremely agitated was, she told me that she has court hearing and wanted to leave  Readings of high blood pressure  Finish course of antibiotic for UTI  Past Medical History:     Past Medical History:   Diagnosis Date    Anxiety     Bipolar 1 disorder ([facility]     Depression         Past Surgical History:     Past Surgical History:   Procedure Laterality Date    OPEN TREATMENT BIMALLEOLAR ANKLE FRACTURE Right 9/2/2018    ANKLE OPEN REDUCTION INTERNAL FIXATION performed by Vinicio Pendleton MD at [address] Right 9/2/2018    RADIUS OPEN REDUCTION INTERNAL FIXATION performed by Vinicio Pendleton MD at [place] [address]        Medications Prior to Admission:     Prior to Admission medications    Medication Sig Start Date End Date Taking?  Authorizing Provider   lisinopril (PRINIVIL;ZESTRIL) 10 MG tablet Take 1 tablet by mouth daily 3/22/22  Yes Kameron Parks MD   metFORMIN (GLUCOPHAGE) 500 MG tablet Take 1 tablet by mouth 2 times daily (with meals) 3/21/22  Yes Valentín Delatorre MD   paliperidone (INVEGA) 3 MG extended release tablet Take 1 tablet by mouth daily 3/22/22  Yes Valentín Delatorre MD   Multiple Vitamins-Minerals (THERAPEUTIC MULTIVITAMIN-MINERALS) tablet Take 1 tablet by mouth daily 3/22/22  Yes Valentín Delatorre MD   thiamine mononitrate (THIAMINE) 100 MG tablet Take 1 tablet by mouth daily 3/22/22  Yes Valentín Delatorre MD   traZODone (DESYREL) 50 MG tablet Take 1 tablet by mouth nightly as needed for Sleep 3/21/22  Yes Valentín Delatorre MD        Allergies:     Patient has no known allergies. Social History:     Tobacco:    reports that she has been smoking cigarettes. She has been smoking about 1.00 pack per day. She has never used smokeless tobacco.  Alcohol:      reports current alcohol use. Drug Use:  reports current drug use. Drugs: Cocaine and Marijuana (Ravi Katz). Family History:     No family history on file. Review of Systems:     Positive and Negative as described in HPI. CONSTITUTIONAL:  negative for fevers, chills, sweats, fatigue, weight loss  HEENT:  negative for vision, hearing changes, runny nose, throat pain  RESPIRATORY:  negative for shortness of breath, cough, congestion, wheezing. CARDIOVASCULAR:  negative for chest pain, palpitations.   GASTROINTESTINAL:  negative for nausea, vomiting, diarrhea, constipation, change in bowel habits, abdominal pain   GENITOURINARY:  Dysuria  INTEGUMENT:  negative for rash, skin lesions, easy bruising   HEMATOLOGIC/LYMPHATIC:  negative for swelling/edema   ALLERGIC/IMMUNOLOGIC:  negative for urticaria , itching  ENDOCRINE:  negative increase in drinking, increase in urination, hot or cold intolerance  MUSCULOSKELETAL:  negative joint pains, muscle aches, swelling of joints  NEUROLOGICAL:  negative for headaches, dizziness, lightheadedness, numbness, pain, tingling extremities    Physical Exam:     /76   Pulse 94   Temp 97.5 °F (36.4 °C) (Temporal)   Resp 14   Ht 5' 4\" (1.626 m)   Wt 280 lb (127 kg)   SpO2 96%   BMI 48.06 kg/m²   Temp (24hrs), Av.7 °F (36.5 °C), Min:97.5 °F (36.4 °C), Max:97.9 °F (36.6 °C)    No results for input(s): POCGLU in the last 72 hours. No intake or output data in the 24 hours ending 22 1448    General Appearance:  alert, well appearing, and in no acute distress  Mental status: oriented to person, place, and time with normal affect  Head:  normocephalic, atraumatic. Eye: no icterus, redness, pupils equal and reactive, extraocular eye movements intact, conjunctiva clear  Ear: normal external ear, no discharge, hearing intact  Nose:  no drainage noted  Mouth: mucous membranes moist  Neck: supple, no carotid bruits, thyroid not palpable  Lungs: Bilateral equal air entry, clear to ausculation, no wheezing, rales or rhonchi, normal effort  Cardiovascular: normal rate, regular rhythm, no murmur, gallop, rub. Abdomen: Soft, nontender, nondistended, normal bowel sounds, no hepatomegaly or splenomegaly  Neurologic: There are no new focal motor or sensory deficits, normal muscle tone and bulk, no abnormal sensation, normal speech, cranial nerves II through XII grossly intact  Skin: No gross lesions, rashes, bruising or bleeding on exposed skin area  Extremities:  peripheral pulses palpable, no pedal edema or calf pain with palpation      Investigations:      Laboratory Testing:  No results found for this or any previous visit (from the past 24 hour(s)).         Consultations:   IP CONSULT TO INTERNAL MEDICINE  IP CONSULT TO SOCIAL WORK  Assessment :      Primary Problem  Major depressive disorder, recurrent episode, severe, with psychosis ([address]    LAURE/Herm[facility] [address]ers [address] Problems    Diagnosis Date Noted    Major depressive disorder, recurrent episode, severe, with psychosis ([address] [F33.3] 2022    Alcohol use disorder, severe, dependence ([address] [F10.20] 2022    Marijuana use [F12.90] 2022    Cocaine use [F14.90] 2022    Amphetamine abuse ([facility] [F15.10] 03/16/2022       Plan:     42year-old patient morbidly obese class III or higher BMI 48.06 weighs 280 pounds  Complains of dysuria increased frequency lower abdominal pain but no flank pain no fever chills no nausea vomiting  Urinalysis suggestive of UTI cultures pending  We will start oral Cipro 500 twice a day awaiting cultures  No history of ESBL UTI    3/17   Patient symptoms improved  We will complete total 3 days of antibiotic  Ordering HbA1c  Last HbA1c was 7   3/18   Patient HbA1c 7  Starting patient Metformin 500 twice a day  3/19   No new complaints  Blood sugars, better controlled  Has several readings of high blood pressure  We will monitor  3/20   Uncontrolled hypertension,, partly due to patient having agitation  Started patient on lisinopril with history of diabetes  3/21   Patient clinically doing better  Blood pressure, blood sugar controlled  Advised about weight loss  Counseled about need for continuation of metformin lisinopril  Need to follow with PCP as outpatient    Viky Haywood MD  3/21/2022  2:48 PM    Copy sent to Dr. Ruth Sanchez (Inactive)    Please note that this chart was generated using voice recognition Dragon dictation software. Although every effort was made to ensure the accuracy of this automated transcription, some errors in transcription may have occurred.
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
## 2022-03-21 NOTE — PROGRESS NOTES
Patient given tobacco quitline number 82218432951 at this time, refusing to call at this time, states \" I just dont want to quit now\"- patient given information as to the dangers of long term tobacco use. Continue to reinforce the importance of tobacco cessation.

## 2022-03-21 NOTE — BH NOTE
Patient given tobacco quitline number 33010854916 at this time, refusing to call at this time, states \" I just dont want to quit now\"- patient given information as to the dangers of long term tobacco use. Continue to reinforce the importance of tobacco cessation.
RT ASSESSMENT TREATMENT GOALS    [x]Pt Goal: Pt will identify 1-2 positive coping skills by time of discharge. []Pt Goal: Pt will identify 1-2 positive aspects of self by time of discharge. []Pt Goal: Pt will remain on task/topic for 15-30 minutes during group by time of discharge. [x]Pt Goal: Pt will identify 1-2 aspects of relapse prevention plan by time of discharge. []Pt Goal: Pt will join in conversation with peers 1-2 times per group by time of discharge. []Pt Goal: Pt will identify 1-2 new leisure interests by time of discharge. []Pt Goal: Pt will not voice any delusional content by time of discharge.
patient refused to attend  Goals group at 0900 after encouragement from staff.   1:1 talk time provided as alternative to group session
patient refused to attend goals group at 0900 after encouragement from staff.   1:1 talk time provided as alternative to group session
patient refused to attend wellness group at 1330 after encouragement from staff.   1:1 talk time provided as alternative to group session
(x )  Basic information about quitting (benefits of quitting, techniques in how to quit, available resources  ( ) Referral for counseling faxed to [facility]                                           ( ) Patient refused counseling  ( ) Patient has not smoked in the last 30 days    Metabolic Screening:    Lab Results   Component Value Date    LABA1C 7.0 (H) 09/01/2018       Lab Results   Component Value Date    CHOL 143 05/25/2013     Lab Results   Component Value Date    TRIG 89 05/25/2013     Lab Results   Component Value Date    HDL 39 (L) 05/25/2013     No components found for: LDLCAL  No results found for: LABVLDL      Body mass index is 48.06 kg/m². BP Readings from Last 2 Encounters:   03/15/22 (!) 129/91   09/10/18 113/76           Pt admitted with followings belongings:  Dental Appliances: None  Vision - Corrective Lenses: None  Hearing Aid: None  Jewelry: Ring,Necklace,Bracelet  Body Piercings Removed: N/A  Clothing: Nath Rasp / coat,Pants,Shirt,Socks,Undergarments (Comment)  Were All Patient Medications Collected?: Not Applicable (eyedrops)  Other Valuables: Cell phone,Other (Comment) (Patient has items in security)     Patient came in as a direct admit from [facility] with suicidal thoughts with no plan. Hearing voices but can't make out what they're saying. Denies HI. Poor sleep. Tested positive for amphetamines, marijuana, and cocaine. Only signed voluntary on admit. States she is linked with [facility] but doesn't have any medications. Patient's home medications were reviewed. Patient oriented to surroundings and program expectations and copy of patient rights given. Received admission packet. Signed voluntary admission form, refused other paperwork on admit. Patient verbalizes understanding.                  [facility]

## 2022-03-21 NOTE — PLAN OF CARE
Problem: Altered Mood, Depressive Behavior:  Goal: Able to verbalize and/or display a decrease in depressive symptoms  Description: Able to verbalize and/or display a decrease in depressive symptoms  Outcome: Ongoing     Problem: Altered Mood, Depressive Behavior:  Goal: Ability to disclose and discuss suicidal ideas will improve  Description: Ability to disclose and discuss suicidal ideas will improve  3/20/2022 2212 by Dixie James LPN  Outcome: Ongoing     Problem: Altered Mood, Psychotic Behavior:  Goal: Able to verbalize decrease in frequency and intensity of hallucinations  Description: Able to verbalize decrease in frequency and intensity of hallucinations  3/20/2022 2212 by Dixie James LPN  Outcome: Ongoing

## 2022-06-27 ENCOUNTER — HOSPITAL ENCOUNTER (EMERGENCY)
Age: 42
Discharge: HOME | End: 2022-06-27
Payer: MEDICAID

## 2022-06-27 VITALS
OXYGEN SATURATION: 99 % | BODY MASS INDEX: 34.15 KG/M2 | DIASTOLIC BLOOD PRESSURE: 90 MMHG | TEMPERATURE: 97.8 F | SYSTOLIC BLOOD PRESSURE: 134 MMHG | RESPIRATION RATE: 16 BRPM | WEIGHT: 200 LBS | HEART RATE: 90 BPM | HEIGHT: 64 IN

## 2022-06-27 DIAGNOSIS — K04.7 DENTAL INFECTION: Primary | ICD-10-CM

## 2022-06-27 PROCEDURE — 99213 OFFICE O/P EST LOW 20 MIN: CPT

## 2022-06-27 RX ORDER — AMOXICILLIN 875 MG/1
875 TABLET, COATED ORAL 2 TIMES DAILY
Qty: 20 TABLET | Refills: 0 | Status: ON HOLD | OUTPATIENT
Start: 2022-06-27 | End: 2022-07-09 | Stop reason: HOSPADM

## 2022-06-27 ASSESSMENT — ENCOUNTER SYMPTOMS
CHEST TIGHTNESS: 0
APNEA: 0
COUGH: 0
SHORTNESS OF BREATH: 0
CHOKING: 0
STRIDOR: 0
WHEEZING: 0
FACIAL SWELLING: 0
TRISMUS: 0

## 2022-06-27 ASSESSMENT — PAIN SCALES - GENERAL: PAINLEVEL_OUTOF10: 10

## 2022-06-27 ASSESSMENT — PAIN - FUNCTIONAL ASSESSMENT: PAIN_FUNCTIONAL_ASSESSMENT: 0-10

## 2022-06-27 NOTE — ED PROVIDER NOTES
[facility]  Urgent Care Encounter      CHIEF COMPLAINT       Chief Complaint   Patient presents with    Dental Pain     right upper and lower       Nurses Notes reviewed and I agree except as noted in the HPI. HISTORY OFPRESENT ILLNESS   Shira Nye is a 36 y.o. The history is provided by the patient. No  was used. Dental Pain  Location:  Lower and upper  Upper teeth location:  1/RU 3rd molar  Lower teeth location:  32/RL 3rd molar  Quality:  Throbbing, aching and localized  Severity:  Severe  Onset quality:  Gradual  Timing:  Intermittent  Progression:  Waxing and waning  Chronicity:  New  Context: dental caries and poor dentition    Context: not abscess, cap still on, not crown fracture, not dental fracture, not enamel fracture, filling intact, not intrusion, not malocclusion, not recent dental surgery and not trauma    Relieved by:  Nothing  Worsened by:  Cold food/drink, hot food/drink, touching, jaw movement and pressure  Ineffective treatments:  Acetaminophen, NSAIDs and topical anesthetic gel  Associated symptoms: gum swelling    Associated symptoms: no congestion, no difficulty swallowing, no drooling, no facial pain, no facial swelling, no fever, no headaches, no neck pain, no neck swelling, no oral bleeding, no oral lesions and no trismus    Risk factors: no alcohol problem, no cancer, no chewing tobacco use, no diabetes, no immunosuppression, sufficient dental care, no periodontal disease and no smoking        REVIEW OF SYSTEMS     Review of Systems   Constitutional: Negative for activity change, appetite change, chills, diaphoresis, fatigue and fever. HENT: Positive for dental problem. Negative for congestion, drooling, facial swelling and mouth sores. Respiratory: Negative for apnea, cough, choking, chest tightness, shortness of breath, wheezing and stridor. Cardiovascular: Negative for chest pain, palpitations and leg swelling. Musculoskeletal: Negative for neck pain. Neurological: Negative for dizziness, light-headedness and headaches. PAST MEDICAL HISTORY         Diagnosis Date    Anxiety     Bipolar 1 disorder ([facility] [address]     Depression        SURGICAL HISTORY     Patient  has a past surgical history that includes open treatment radial shaft fracture (Right, 9/2/2018) and open treatment bimalleolar ankle fracture (Right, 9/2/2018). CURRENT MEDICATIONS       Discharge Medication List as of 6/27/2022  1:00 PM      CONTINUE these medications which have NOT CHANGED    Details   lisinopril (PRINIVIL;ZESTRIL) 10 MG tablet Take 1 tablet by mouth daily, Disp-30 tablet, R-3Normal      metFORMIN (GLUCOPHAGE) 500 MG tablet Take 1 tablet by mouth 2 times daily (with meals), Disp-60 tablet, R-3Normal      paliperidone (INVEGA) 3 MG extended release tablet Take 1 tablet by mouth daily, Disp-30 tablet, R-3Normal      Multiple Vitamins-Minerals (THERAPEUTIC MULTIVITAMIN-MINERALS) tablet Take 1 tablet by mouth daily, Disp-30 tablet, R-0Normal      thiamine mononitrate (THIAMINE) 100 MG tablet Take 1 tablet by mouth daily, Disp-30 tablet, R-0Normal      traZODone (DESYREL) 50 MG tablet Take 1 tablet by mouth nightly as needed for Sleep, Disp-30 tablet, R-0Normal             ALLERGIES     Patient is has No Known Allergies. FAMILY HISTORY     Patient's family history is not on file. SOCIAL HISTORY     Patient  reports that she has been smoking cigarettes. She has been smoking about 1.00 pack per day. She has never used smokeless tobacco. She reports current alcohol use. She reports current drug use. Drugs: Cocaine and Marijuana (Levorn Jean). PHYSICAL EXAM     ED TRIAGE VITALS  BP: (!) 134/90, Temp: 97.8 °F (36.6 °C), Heart Rate: 90, Resp: 16, SpO2: 99 %  Physical Exam  Vitals and nursing note reviewed. Constitutional:       General: She is awake. She is not in acute distress. Appearance: Normal appearance.  She is well-developed and well-groomed. She is obese. She is not ill-appearing, toxic-appearing or diaphoretic. HENT:      Head: Normocephalic and atraumatic. Right Ear: External ear normal.      Left Ear: External ear normal.      Mouth/Throat:      Lips: Pink. Dentition: Abnormal dentition. Does not have dentures. Dental tenderness, gingival swelling and dental caries present. No dental abscesses or gum lesions. Eyes:      Extraocular Movements: Extraocular movements intact. Conjunctiva/sclera: Conjunctivae normal.   Pulmonary:      Effort: Pulmonary effort is normal.   Musculoskeletal:         General: Normal range of motion. Cervical back: Normal range of motion. Skin:     General: Skin is warm. Neurological:      General: No focal deficit present. Mental Status: She is alert. Psychiatric:         Mood and Affect: Mood normal.         Behavior: Behavior normal. Behavior is cooperative. Thought Content: Thought content normal.         Judgment: Judgment normal.         DIAGNOSTIC RESULTS   Labs:No results found for this visit on 06/27/22. IMAGING:  No orders to display     URGENT CARE COURSE:     Vitals:    06/27/22 1243   BP: (!) 134/90   Pulse: 90   Resp: 16   Temp: 97.8 °F (36.6 °C)   TempSrc: Temporal   SpO2: 99%   Weight: 200 lb (90.7 kg)   Height: 5' 4\" (1.626 m)       Medications - No data to display  PROCEDURES:  None  FINAL IMPRESSION      1. Dental infection        DISPOSITION/PLAN          The patient was also advised to eat a soft diet, chew on the opposite side of pain, Use a soft bristle toothbrush and warm saltwater lavage after eating. The patient was advised to take the medication as directed. The patient was also advised to follow-up with a local dentist ASAP. We also discussed returning to the Emergency Department immediately if new or worsening symptoms occur. We have discussed the symptoms which are most concerning that necessitate immediate return.         PATIENT REFERRED TO:  [address] [address]  Call today  [phone]    DISCHARGE MEDICATIONS:  Discharge Medication List as of 6/27/2022  1:00 PM      START taking these medications    Details   amoxicillin (AMOXIL) 875 MG tablet Take 1 tablet by mouth 2 times daily for 10 days, Disp-20 tablet, R-0Normal           Discharge Medication List as of 6/27/2022  1:00 PM          FEDERICO Ramirez CNP, APRN - CNP  06/27/22 5473

## 2022-06-27 NOTE — ED NOTES
To [facility] with complaints of dental pain, upper and lower right side.  No dentist.      Ladi Mathew RN  06/27/22 3167

## 2022-07-07 ENCOUNTER — HOSPITAL ENCOUNTER (INPATIENT)
Age: 42
LOS: 2 days | Discharge: HOME | DRG: 420 | End: 2022-07-09
Attending: FAMILY MEDICINE | Admitting: INTERNAL MEDICINE
Payer: MEDICAID

## 2022-07-07 DIAGNOSIS — E08.3393: ICD-10-CM

## 2022-07-07 DIAGNOSIS — R53.83 FATIGUE, UNSPECIFIED TYPE: ICD-10-CM

## 2022-07-07 DIAGNOSIS — R73.9 HYPERGLYCEMIA: ICD-10-CM

## 2022-07-07 DIAGNOSIS — R53.1 GENERAL WEAKNESS: Primary | ICD-10-CM

## 2022-07-07 DIAGNOSIS — H54.7 DECREASED VISUAL ACUITY: ICD-10-CM

## 2022-07-07 DIAGNOSIS — E11.9 NEW ONSET TYPE 2 DIABETES MELLITUS (HCC): ICD-10-CM

## 2022-07-07 PROBLEM — E11.65 HYPERGLYCEMIA DUE TO TYPE 2 DIABETES MELLITUS (HCC): Status: ACTIVE | Noted: 2022-07-07

## 2022-07-07 LAB
ALBUMIN SERPL-MCNC: 4.3 G/DL (ref 3.5–5.1)
ALP BLD-CCNC: 93 U/L (ref 38–126)
ALT SERPL-CCNC: 54 U/L (ref 11–66)
ANION GAP SERPL CALCULATED.3IONS-SCNC: 15 MEQ/L (ref 8–16)
AST SERPL-CCNC: 26 U/L (ref 5–40)
BACTERIA: ABNORMAL /HPF
BASOPHILS # BLD: 1 %
BASOPHILS ABSOLUTE: 0.1 THOU/MM3 (ref 0–0.1)
BETA-HYDROXYBUTYRATE: 4.48 MG/DL (ref 0.2–2.81)
BILIRUB SERPL-MCNC: 0.4 MG/DL (ref 0.3–1.2)
BILIRUBIN DIRECT: < 0.2 MG/DL (ref 0–0.3)
BILIRUBIN URINE: NEGATIVE
BLOOD, URINE: ABNORMAL
BUN BLDV-MCNC: 20 MG/DL (ref 7–22)
CALCIUM SERPL-MCNC: 10 MG/DL (ref 8.5–10.5)
CASTS 2: ABNORMAL /LPF
CASTS UA: ABNORMAL /LPF
CHARACTER, URINE: CLEAR
CHLORIDE BLD-SCNC: 89 MEQ/L (ref 98–111)
CO2: 23 MEQ/L (ref 23–33)
COLOR: YELLOW
CREAT SERPL-MCNC: 0.8 MG/DL (ref 0.4–1.2)
CRYSTALS, UA: ABNORMAL
EKG ATRIAL RATE: 97 BPM
EKG P AXIS: 64 DEGREES
EKG P-R INTERVAL: 150 MS
EKG Q-T INTERVAL: 360 MS
EKG QRS DURATION: 76 MS
EKG QTC CALCULATION (BAZETT): 457 MS
EKG R AXIS: 33 DEGREES
EKG T AXIS: 62 DEGREES
EKG VENTRICULAR RATE: 97 BPM
EOSINOPHIL # BLD: 2 %
EOSINOPHILS ABSOLUTE: 0.2 THOU/MM3 (ref 0–0.4)
EPITHELIAL CELLS, UA: ABNORMAL /HPF
ERYTHROCYTE [DISTWIDTH] IN BLOOD BY AUTOMATED COUNT: 11.9 % (ref 11.5–14.5)
ERYTHROCYTE [DISTWIDTH] IN BLOOD BY AUTOMATED COUNT: 36 FL (ref 35–45)
GFR SERPL CREATININE-BSD FRML MDRD: > 90 ML/MIN/1.73M2
GLUCOSE BLD-MCNC: 436 MG/DL (ref 70–108)
GLUCOSE BLD-MCNC: 481 MG/DL (ref 70–108)
GLUCOSE BLD-MCNC: 491 MG/DL (ref 70–108)
GLUCOSE BLD-MCNC: 524 MG/DL (ref 70–108)
GLUCOSE BLD-MCNC: 576 MG/DL (ref 70–108)
GLUCOSE BLD-MCNC: 579 MG/DL (ref 70–108)
GLUCOSE URINE: >= 1000 MG/DL
HCT VFR BLD CALC: 40.4 % (ref 37–47)
HEMOGLOBIN: 13.3 GM/DL (ref 12–16)
IMMATURE GRANS (ABS): 0.01 THOU/MM3 (ref 0–0.07)
IMMATURE GRANULOCYTES: 0.1 %
KETONES, URINE: NEGATIVE
LACTIC ACID, SEPSIS: 1.5 MMOL/L (ref 0.5–1.9)
LEUKOCYTE ESTERASE, URINE: NEGATIVE
LYMPHOCYTES # BLD: 42.5 %
LYMPHOCYTES ABSOLUTE: 3.4 THOU/MM3 (ref 1–4.8)
MCH RBC QN AUTO: 27.8 PG (ref 26–33)
MCHC RBC AUTO-ENTMCNC: 32.9 GM/DL (ref 32.2–35.5)
MCV RBC AUTO: 84.3 FL (ref 81–99)
MISCELLANEOUS 2: ABNORMAL
MONOCYTES # BLD: 10.4 %
MONOCYTES ABSOLUTE: 0.8 THOU/MM3 (ref 0.4–1.3)
NITRITE, URINE: NEGATIVE
NUCLEATED RED BLOOD CELLS: 0 /100 WBC
OSMOLALITY CALCULATION: 284.5 MOSMOL/KG (ref 275–300)
PH UA: 6.5 (ref 5–9)
PLATELET # BLD: 396 THOU/MM3 (ref 130–400)
PMV BLD AUTO: 10.2 FL (ref 9.4–12.4)
POTASSIUM REFLEX MAGNESIUM: 3.9 MEQ/L (ref 3.5–5.2)
PREGNANCY, SERUM: NEGATIVE
PROTEIN UA: NEGATIVE
RBC # BLD: 4.79 MILL/MM3 (ref 4.2–5.4)
RBC URINE: ABNORMAL /HPF
RENAL EPITHELIAL, UA: ABNORMAL
SEG NEUTROPHILS: 44 %
SEGMENTED NEUTROPHILS ABSOLUTE COUNT: 3.6 THOU/MM3 (ref 1.8–7.7)
SODIUM BLD-SCNC: 127 MEQ/L (ref 135–145)
SPECIFIC GRAVITY, URINE: > 1.03 (ref 1–1.03)
TOTAL PROTEIN: 7.7 G/DL (ref 6.1–8)
TROPONIN T: < 0.01 NG/ML
TSH SERPL DL<=0.05 MIU/L-ACNC: 0.5 UIU/ML (ref 0.4–4.2)
UROBILINOGEN, URINE: 0.2 EU/DL (ref 0–1)
WBC # BLD: 8.1 THOU/MM3 (ref 4.8–10.8)
WBC UA: ABNORMAL /HPF
YEAST: ABNORMAL

## 2022-07-07 PROCEDURE — 82948 REAGENT STRIP/BLOOD GLUCOSE: CPT

## 2022-07-07 PROCEDURE — 80076 HEPATIC FUNCTION PANEL: CPT

## 2022-07-07 PROCEDURE — 83605 ASSAY OF LACTIC ACID: CPT

## 2022-07-07 PROCEDURE — 93010 ELECTROCARDIOGRAM REPORT: CPT | Performed by: INTERNAL MEDICINE

## 2022-07-07 PROCEDURE — 84484 ASSAY OF TROPONIN QUANT: CPT

## 2022-07-07 PROCEDURE — 96374 THER/PROPH/DIAG INJ IV PUSH: CPT

## 2022-07-07 PROCEDURE — 2580000003 HC RX 258: Performed by: INTERNAL MEDICINE

## 2022-07-07 PROCEDURE — 99223 1ST HOSP IP/OBS HIGH 75: CPT | Performed by: INTERNAL MEDICINE

## 2022-07-07 PROCEDURE — 80048 BASIC METABOLIC PNL TOTAL CA: CPT

## 2022-07-07 PROCEDURE — G0378 HOSPITAL OBSERVATION PER HR: HCPCS

## 2022-07-07 PROCEDURE — 85025 COMPLETE CBC W/AUTO DIFF WBC: CPT

## 2022-07-07 PROCEDURE — 2580000003 HC RX 258: Performed by: FAMILY MEDICINE

## 2022-07-07 PROCEDURE — 96372 THER/PROPH/DIAG INJ SC/IM: CPT

## 2022-07-07 PROCEDURE — 96361 HYDRATE IV INFUSION ADD-ON: CPT

## 2022-07-07 PROCEDURE — 82010 KETONE BODYS QUAN: CPT

## 2022-07-07 PROCEDURE — 84443 ASSAY THYROID STIM HORMONE: CPT

## 2022-07-07 PROCEDURE — 6370000000 HC RX 637 (ALT 250 FOR IP): Performed by: INTERNAL MEDICINE

## 2022-07-07 PROCEDURE — 80307 DRUG TEST PRSMV CHEM ANLYZR: CPT

## 2022-07-07 PROCEDURE — 6370000000 HC RX 637 (ALT 250 FOR IP): Performed by: FAMILY MEDICINE

## 2022-07-07 PROCEDURE — 93005 ELECTROCARDIOGRAM TRACING: CPT | Performed by: FAMILY MEDICINE

## 2022-07-07 PROCEDURE — 36415 COLL VENOUS BLD VENIPUNCTURE: CPT

## 2022-07-07 PROCEDURE — 81001 URINALYSIS AUTO W/SCOPE: CPT

## 2022-07-07 PROCEDURE — 1200000003 HC TELEMETRY R&B

## 2022-07-07 PROCEDURE — 99285 EMERGENCY DEPT VISIT HI MDM: CPT

## 2022-07-07 PROCEDURE — 83036 HEMOGLOBIN GLYCOSYLATED A1C: CPT

## 2022-07-07 PROCEDURE — 84703 CHORIONIC GONADOTROPIN ASSAY: CPT

## 2022-07-07 RX ORDER — ONDANSETRON 4 MG/1
4 TABLET, ORALLY DISINTEGRATING ORAL EVERY 8 HOURS PRN
Status: DISCONTINUED | OUTPATIENT
Start: 2022-07-07 | End: 2022-07-09 | Stop reason: HOSPADM

## 2022-07-07 RX ORDER — ACETAMINOPHEN 650 MG/1
650 SUPPOSITORY RECTAL EVERY 6 HOURS PRN
Status: DISCONTINUED | OUTPATIENT
Start: 2022-07-07 | End: 2022-07-09 | Stop reason: HOSPADM

## 2022-07-07 RX ORDER — INSULIN LISPRO 100 [IU]/ML
0-9 INJECTION, SOLUTION INTRAVENOUS; SUBCUTANEOUS NIGHTLY
Status: DISCONTINUED | OUTPATIENT
Start: 2022-07-07 | End: 2022-07-09 | Stop reason: HOSPADM

## 2022-07-07 RX ORDER — SODIUM CHLORIDE 9 MG/ML
INJECTION, SOLUTION INTRAVENOUS CONTINUOUS
Status: DISCONTINUED | OUTPATIENT
Start: 2022-07-08 | End: 2022-07-09 | Stop reason: HOSPADM

## 2022-07-07 RX ORDER — ACETAMINOPHEN 325 MG/1
650 TABLET ORAL EVERY 6 HOURS PRN
Status: DISCONTINUED | OUTPATIENT
Start: 2022-07-07 | End: 2022-07-09 | Stop reason: HOSPADM

## 2022-07-07 RX ORDER — QUETIAPINE FUMARATE 25 MG/1
50 TABLET, FILM COATED ORAL NIGHTLY
Status: DISCONTINUED | OUTPATIENT
Start: 2022-07-08 | End: 2022-07-09 | Stop reason: HOSPADM

## 2022-07-07 RX ORDER — POLYETHYLENE GLYCOL 3350 17 G/17G
17 POWDER, FOR SOLUTION ORAL DAILY PRN
Status: DISCONTINUED | OUTPATIENT
Start: 2022-07-07 | End: 2022-07-09 | Stop reason: HOSPADM

## 2022-07-07 RX ORDER — ENOXAPARIN SODIUM 100 MG/ML
40 INJECTION SUBCUTANEOUS DAILY
Status: DISCONTINUED | OUTPATIENT
Start: 2022-07-08 | End: 2022-07-09 | Stop reason: HOSPADM

## 2022-07-07 RX ORDER — ONDANSETRON 2 MG/ML
4 INJECTION INTRAMUSCULAR; INTRAVENOUS EVERY 6 HOURS PRN
Status: DISCONTINUED | OUTPATIENT
Start: 2022-07-07 | End: 2022-07-09 | Stop reason: HOSPADM

## 2022-07-07 RX ORDER — 0.9 % SODIUM CHLORIDE 0.9 %
1000 INTRAVENOUS SOLUTION INTRAVENOUS ONCE
Status: COMPLETED | OUTPATIENT
Start: 2022-07-07 | End: 2022-07-07

## 2022-07-07 RX ORDER — TRAZODONE HYDROCHLORIDE 50 MG/1
50 TABLET ORAL NIGHTLY PRN
Status: DISCONTINUED | OUTPATIENT
Start: 2022-07-08 | End: 2022-07-09 | Stop reason: HOSPADM

## 2022-07-07 RX ORDER — INSULIN GLARGINE 100 [IU]/ML
15 INJECTION, SOLUTION SUBCUTANEOUS 2 TIMES DAILY
Status: DISCONTINUED | OUTPATIENT
Start: 2022-07-07 | End: 2022-07-08

## 2022-07-07 RX ORDER — LISINOPRIL 10 MG/1
10 TABLET ORAL DAILY
Status: DISCONTINUED | OUTPATIENT
Start: 2022-07-08 | End: 2022-07-09 | Stop reason: HOSPADM

## 2022-07-07 RX ORDER — INSULIN LISPRO 100 [IU]/ML
0-18 INJECTION, SOLUTION INTRAVENOUS; SUBCUTANEOUS
Status: DISCONTINUED | OUTPATIENT
Start: 2022-07-08 | End: 2022-07-09 | Stop reason: HOSPADM

## 2022-07-07 RX ORDER — SODIUM CHLORIDE 9 MG/ML
INJECTION, SOLUTION INTRAVENOUS PRN
Status: DISCONTINUED | OUTPATIENT
Start: 2022-07-07 | End: 2022-07-09 | Stop reason: HOSPADM

## 2022-07-07 RX ORDER — SODIUM CHLORIDE 0.9 % (FLUSH) 0.9 %
10 SYRINGE (ML) INJECTION EVERY 12 HOURS SCHEDULED
Status: DISCONTINUED | OUTPATIENT
Start: 2022-07-07 | End: 2022-07-09 | Stop reason: HOSPADM

## 2022-07-07 RX ORDER — QUETIAPINE FUMARATE 50 MG/1
50 TABLET, FILM COATED ORAL NIGHTLY
COMMUNITY

## 2022-07-07 RX ORDER — SODIUM CHLORIDE 0.9 % (FLUSH) 0.9 %
10 SYRINGE (ML) INJECTION PRN
Status: DISCONTINUED | OUTPATIENT
Start: 2022-07-07 | End: 2022-07-09 | Stop reason: HOSPADM

## 2022-07-07 RX ORDER — NICOTINE 21 MG/24HR
1 PATCH, TRANSDERMAL 24 HOURS TRANSDERMAL DAILY
Status: DISCONTINUED | OUTPATIENT
Start: 2022-07-08 | End: 2022-07-09 | Stop reason: HOSPADM

## 2022-07-07 RX ADMIN — INSULIN HUMAN 20 UNITS: 100 INJECTION, SOLUTION PARENTERAL at 17:12

## 2022-07-07 RX ADMIN — INSULIN LISPRO 9 UNITS: 100 INJECTION, SOLUTION INTRAVENOUS; SUBCUTANEOUS at 23:11

## 2022-07-07 RX ADMIN — SODIUM CHLORIDE 1000 ML: 9 INJECTION, SOLUTION INTRAVENOUS at 14:28

## 2022-07-07 RX ADMIN — Medication 10 UNITS: at 14:28

## 2022-07-07 RX ADMIN — SODIUM CHLORIDE, PRESERVATIVE FREE 10 ML: 5 INJECTION INTRAVENOUS at 23:10

## 2022-07-07 RX ADMIN — INSULIN GLARGINE 15 UNITS: 100 INJECTION, SOLUTION SUBCUTANEOUS at 23:11

## 2022-07-07 ASSESSMENT — ENCOUNTER SYMPTOMS
ALLERGIC/IMMUNOLOGIC NEGATIVE: 1
GASTROINTESTINAL NEGATIVE: 1
RESPIRATORY NEGATIVE: 1

## 2022-07-07 NOTE — ED TRIAGE NOTES
Pt presents to the ED via triage with c/o dizziness, blurred vision, fatigue, and nausea. Pt states symptoms started last Thursday. Pt states dizziness is constant, even at rest. Pt states she took tylenol yesterday for her headache but has not taken anything for pain today. Pt states she has been feeling very tired and unable to do anything. Pt currently denying chest pain or SOB. RR easy and unlabored.  EKG complete

## 2022-07-07 NOTE — ED NOTES
Pt presents to the ER for fatigue, headache and not feeling well. Pt states that she is extremely thirsty and had \"peeing\" a lot. Pt states that she has not been feeling well for about 1 week. Pt does not have a Hx of Hyperglycemia. Glucose today is 576.      Genoveva Silva  07/07/22 2175

## 2022-07-07 NOTE — ED NOTES
Pt and vs reassessed. RR easy and unlabored. Pt ambulated to the bathroom and tolerated well. Pt now resting in bed alert and medicated per MAR. No distress noted.  Pt stable at this time     Rabun Gapligia [facility]  07/07/22 6451

## 2022-07-07 NOTE — ED NOTES
Pt and vs reassessed. RR easy and unlabored. Pt resting in bed alert and requesting food. RN ordered meal tray and pt denies any other needs. No distress noted.  Pt stable at this time     [facility]  07/07/22 1317

## 2022-07-07 NOTE — LETTER
[facility]  [address] [facility] [address]  Phone: [address] [facility],[address]        July 8, 2022     Patient: Jason Marsh   YOB: 1980   Date of Visit: 7/7/2022       To Whom It May Concern:     Wanda Rose has been admitted to [address] [place] [address] since 7/7/2022 1329, and is currently admitted as of 7/8/2022. If you have any questions or concerns, please don't hesitate to call.     Sincerely,        Philip Morelos RN

## 2022-07-07 NOTE — ED PROVIDER NOTES
[facility] COMPLAINT    Chief Complaint   Patient presents with    Fatigue    Headache    Hyperglycemia       HPI    Vi Uriarte is a 36 y.o. female in sober living (originally from [facility]) who presents with generalized weakness since the onset this past week, with fatigue, generalized weakness, polydipsia and polyuria. The duration has been constant since the onset. The generalized weakness may be associated with unknown cause. She was an abuser of crack cocaine, has voluntarily entered sober living after a brief detox admission in [facility]. Her diet hasn't been the best. She states that she had some vaginosis or toe fungus last month which she treated, and did not complain of any active issues currently. She did start taking some mood stabilizers in the past month. No aggravating or alleviating factors. REVIEW OF SYSTEMS    Cardiac: No chest pain or palpitations  Respiratory: No shortness of breath or new cough  General: +fatigue, weakness, No fevers   Endo: +polydipsia and polyuria  : No dysuria or hematuria  GI: No vomiting or diarrhea  See HPI for further details. All other systems reviewed and are negative.     PAST MEDICAL OR SURGICAL HISTORY    Past Medical History:   Diagnosis Date    Anxiety     Bipolar 1 disorder ([facility] [address]     Depression      Past Surgical History:   Procedure Laterality Date    OPEN TREATMENT BIMALLEOLAR ANKLE FRACTURE Right 9/2/2018    ANKLE OPEN REDUCTION INTERNAL FIXATION performed by Mario Luna MD at [facility] Right 9/2/2018    RADIUS OPEN REDUCTION INTERNAL FIXATION performed by Mario Luna MD at [facility]    Current Outpatient Rx   Medication Sig Dispense Refill    amoxicillin (AMOXIL) 875 MG tablet Take 1 tablet by mouth 2 times daily for 10 days 20 tablet 0    lisinopril (PRINIVIL;ZESTRIL) 10 MG tablet Take 1 tablet by mouth daily 30 tablet 3    metFORMIN (GLUCOPHAGE) 500 MG tablet Take 1 tablet by mouth 2 times daily (with meals) 60 tablet 3    paliperidone (INVEGA) 3 MG extended release tablet Take 1 tablet by mouth daily 30 tablet 3    Multiple Vitamins-Minerals (THERAPEUTIC MULTIVITAMIN-MINERALS) tablet Take 1 tablet by mouth daily 30 tablet 0    thiamine mononitrate (THIAMINE) 100 MG tablet Take 1 tablet by mouth daily 30 tablet 0    traZODone (DESYREL) 50 MG tablet Take 1 tablet by mouth nightly as needed for Sleep 30 tablet 0       ALLERGIES    No Known Allergies    FAMILY OR SOCIAL HISTORY    History reviewed. No pertinent family history. Social History     Socioeconomic History    Marital status: Single     Spouse name: Not on file    Number of children: Not on file    Years of education: Not on file    Highest education level: Not on file   Occupational History    Not on file   Tobacco Use    Smoking status: Current Every Day Smoker     Packs/day: 1.00     Types: Cigarettes    Smokeless tobacco: Never Used   Substance and Sexual Activity    Alcohol use: Yes    Drug use: Yes     Types: Cocaine, Marijuana Rachel Modi)    Sexual activity: Not on file   Other Topics Concern    Not on file   Social History Narrative    Not on file     Social Determinants of Health     Financial Resource Strain:     Difficulty of Paying Living Expenses: Not on file   Food Insecurity:     Worried About Running Out of Food in the Last Year: Not on file    Aparna of Food in the Last Year: Not on file   Transportation Needs:     Lack of Transportation (Medical): Not on file    Lack of Transportation (Non-Medical):  Not on file   Physical Activity:     Days of Exercise per Week: Not on file    Minutes of Exercise per Session: Not on file   Stress:     Feeling of Stress : Not on file   Social Connections:     Frequency of Communication with Friends and Family: Not on file    Frequency of Social Gatherings with Friends and Family: Not on file    Attends Holiness Services: Not on file    Active Member of Clubs or Organizations: Not on file    Attends Club or Organization Meetings: Not on file    Marital Status: Not on file   Intimate Partner Violence:     Fear of Current or Ex-Partner: Not on file    Emotionally Abused: Not on file    Physically Abused: Not on file    Sexually Abused: Not on file   Housing Stability:     Unable to Pay for Housing in the Last Year: Not on file    Number of [place] in the Last Year: Not on file    Unstable Housing in the Last Year: Not on file       PHYSICAL EXAM    VITAL SIGNS: /83   Pulse 92   Temp 98.7 °F (37.1 °C) (Oral)   Resp 29   Ht 5' 4\" (1.626 m)   Wt 200 lb (90.7 kg)   SpO2 95%   BMI 34.33 kg/m²   Constitutional:  Well developed, well nourished, no acute distress  Eyes:  Pupils equally round and reactive to light, sclera nonicteric  HENT:  atraumatic, moist mucous membranes  NECK: Normal range of motion, no JVD  Respiratory:  No respiratory distress, normal breath sounds, no wheezing or tachypnea   Cardiovascular:  Mild tachycardic rate, normal rhythm, no murmurs   GI:  Soft, nondistended, normal bowel sounds, nontender  Musculoskeletal:  No edema, no acute deformities   Integument:  Skin is warm and dry, no obvious rash    Neurologic: awake, alert, oriented x3, handgrip is 5/5 bilaterally, normal finger to nose test bilaterally  Psychiatric:  normal affect, does not appear anxious    Labs Reviewed   BETA-HYDROXYBUTYRATE - Abnormal; Notable for the following components:       Result Value    Beta-Hydroxybutyrate 4.48 (*)     All other components within normal limits   BASIC METABOLIC PANEL W/ REFLEX TO MG FOR LOW K - Abnormal; Notable for the following components:    Sodium 127 (*)     Chloride 89 (*)     Glucose 579 (*)     All other components within normal limits   URINE WITH REFLEXED MICRO - Abnormal; Notable for the following components:    Glucose, Ur >= 1000 (*)     Specific Gravity, Urine > 1.030 (*)     Blood, Urine MODERATE (*)     All other components within normal limits   POCT GLUCOSE - Abnormal; Notable for the following components:    POC Glucose 576 (*)     All other components within normal limits   POCT GLUCOSE - Abnormal; Notable for the following components:    POC Glucose 524 (*)     All other components within normal limits   POCT GLUCOSE - Abnormal; Notable for the following components:    POC Glucose 481 (*)     All other components within normal limits   POCT GLUCOSE - Abnormal; Notable for the following components:    POC Glucose 491 (*)     All other components within normal limits   LACTATE, SEPSIS   CBC WITH AUTO DIFFERENTIAL   HEPATIC FUNCTION PANEL   TROPONIN   HCG, SERUM, QUALITATIVE   ANION GAP   GLOMERULAR FILTRATION RATE, ESTIMATED   OSMOLALITY       EKG  (Interpreted by me)  EKG Interpretation. EKG Interpretation    Interpreted by emergency department physician on 7/7/22 13:39    Rhythm: normal sinus   Rate: 97 bpm  Axis: normal  Ectopy: none  Conduction: normal  ST Segments: no acute change  T Waves: no acute change  Q Waves: none    Clinical Impression: non-specific EKG      RADIOLOGY/PROCEDURES    No orders to display       ED COURSE & MEDICAL DECISION MAKING    Pertinent Labs & Imaging studies reviewed and interpreted. (See chart for details)  See chart for details of medications given during the ED stay. Vitals:    07/07/22 1431 07/07/22 1530 07/07/22 1716 07/07/22 1830   BP: 110/78 118/81 126/87 103/83   Pulse: 89 88 93 92   Resp: 23 21 16 29   Temp:       TempSrc:       SpO2: 98% 93% 94% 95%   Weight:       Height:           Differential diagnosis: Hyperglycemia, infection, Dehydration, potentially/metabolic problem, DKA, medication reaction, multiple sclerosis other    FINAL IMPRESSION    1. General weakness    2. Fatigue, unspecified type    3. Hyperglycemia    4.  Moderate nonproliferative diabetic retinopathy of both eyes associated with diabetes mellitus due to underlying condition, macular edema presence unspecified ([facility] [address]    5. Decreased visual acuity        PLAN  MDM - hyperglycemia of unknown origin. Pt has no signs of toxicity or sepsis. Pt did start taking trazodone and seroquel since going to inpt detox/rehab. She is in sober living currently (scheduled for next 18 mo), certainly this acute episode could be new onset diabetes, but need to rule out infection or medication etiology as the cause of her symptoms. Despite giving pt a liter of IVF, 30 units of insulin (novolog), pt's blood sugar barely budged. Therefore, we do worry if this intractable hyperglycemia suggests a more evolved diabetes mellitus, and the possible need to be placed on a more aggressive insulin regimen. Furthermore, her persistent blurry vision also needs to be monitored to check for improvement (likely diabetic retinopathy) once her hyperglycemia improves. Since she is not currently in DKA, I do not think pt needs to be placed on an insulin drip. I did not find any source of possible infection to explain her persistent hyperglycemia. In any event, pt will be admitted to medicine under the expert guidance of Dr. Jaden Groves.         Reno Pettit MD  07/07/22 2026

## 2022-07-07 NOTE — ED NOTES
Pt and vs reassessed. RR easy and unlabored. Pt resting in bed with eyes closed and appears to be sleeping. No distress noted.  Pt stable at this time     [facility]  07/07/22 8441

## 2022-07-07 NOTE — ED NOTES
Pt and vs reassessed. RR easy and unlabored. Pt resting in bed alert and medicated per MAR. No distress noted.  Pt stable at this time     William Virgen, [place]  07/07/22 1184

## 2022-07-08 PROBLEM — F41.9 ANXIETY AND DEPRESSION: Status: ACTIVE | Noted: 2022-07-08

## 2022-07-08 PROBLEM — R73.09 HEMOGLOBIN A1C GREATER THAN 9.0%: Status: ACTIVE | Noted: 2022-07-08

## 2022-07-08 PROBLEM — F17.200 TOBACCO USE DISORDER: Status: ACTIVE | Noted: 2022-07-08

## 2022-07-08 PROBLEM — F32.A ANXIETY AND DEPRESSION: Status: ACTIVE | Noted: 2022-07-08

## 2022-07-08 PROBLEM — F31.70 BIPOLAR AFFECTIVE DISORDER IN REMISSION (HCC): Status: ACTIVE | Noted: 2022-07-08

## 2022-07-08 PROBLEM — I10 BENIGN ESSENTIAL HYPERTENSION: Status: ACTIVE | Noted: 2022-07-08

## 2022-07-08 PROBLEM — R79.89 PSEUDOHYPONATREMIA: Status: ACTIVE | Noted: 2022-07-08

## 2022-07-08 PROBLEM — E66.09 CLASS 1 OBESITY DUE TO EXCESS CALORIES WITHOUT SERIOUS COMORBIDITY WITH BODY MASS INDEX (BMI) OF 34.0 TO 34.9 IN ADULT: Status: ACTIVE | Noted: 2022-07-08

## 2022-07-08 PROBLEM — H26.9 IMMATURE CATARACT OF BOTH EYES: Status: ACTIVE | Noted: 2022-07-08

## 2022-07-08 LAB
AMPHETAMINE+METHAMPHETAMINE URINE SCREEN: NEGATIVE
AVERAGE GLUCOSE: 294 MG/DL (ref 70–126)
BARBITURATE QUANTITATIVE URINE: NEGATIVE
BENZODIAZEPINE QUANTITATIVE URINE: NEGATIVE
CANNABINOID QUANTITATIVE URINE: NEGATIVE
CHOLESTEROL, TOTAL: 178 MG/DL (ref 100–199)
COCAINE METABOLITE QUANTITATIVE URINE: NEGATIVE
GLUCOSE BLD-MCNC: 214 MG/DL (ref 70–108)
GLUCOSE BLD-MCNC: 305 MG/DL (ref 70–108)
GLUCOSE BLD-MCNC: 342 MG/DL (ref 70–108)
GLUCOSE BLD-MCNC: 416 MG/DL (ref 70–108)
HBA1C MFR BLD: 11.8 % (ref 4.4–6.4)
HDLC SERPL-MCNC: 33 MG/DL
LACTIC ACID: 1.4 MMOL/L (ref 0.5–2)
LDL CHOLESTEROL CALCULATED: 118 MG/DL
OPIATES, URINE: NEGATIVE
OXYCODONE: NEGATIVE
PHENCYCLIDINE QUANTITATIVE URINE: NEGATIVE
TRIGL SERPL-MCNC: 135 MG/DL (ref 0–199)

## 2022-07-08 PROCEDURE — 6370000000 HC RX 637 (ALT 250 FOR IP): Performed by: PHYSICIAN ASSISTANT

## 2022-07-08 PROCEDURE — 82948 REAGENT STRIP/BLOOD GLUCOSE: CPT

## 2022-07-08 PROCEDURE — 6360000002 HC RX W HCPCS: Performed by: INTERNAL MEDICINE

## 2022-07-08 PROCEDURE — 2580000003 HC RX 258: Performed by: INTERNAL MEDICINE

## 2022-07-08 PROCEDURE — 1200000003 HC TELEMETRY R&B

## 2022-07-08 PROCEDURE — 96372 THER/PROPH/DIAG INJ SC/IM: CPT

## 2022-07-08 PROCEDURE — 6370000000 HC RX 637 (ALT 250 FOR IP): Performed by: NURSE PRACTITIONER

## 2022-07-08 PROCEDURE — 80061 LIPID PANEL: CPT

## 2022-07-08 PROCEDURE — 96361 HYDRATE IV INFUSION ADD-ON: CPT

## 2022-07-08 PROCEDURE — 36415 COLL VENOUS BLD VENIPUNCTURE: CPT

## 2022-07-08 PROCEDURE — 99232 SBSQ HOSP IP/OBS MODERATE 35: CPT | Performed by: NURSE PRACTITIONER

## 2022-07-08 PROCEDURE — G0378 HOSPITAL OBSERVATION PER HR: HCPCS

## 2022-07-08 PROCEDURE — 6370000000 HC RX 637 (ALT 250 FOR IP): Performed by: INTERNAL MEDICINE

## 2022-07-08 PROCEDURE — 83605 ASSAY OF LACTIC ACID: CPT

## 2022-07-08 RX ORDER — INSULIN LISPRO 100 [IU]/ML
10 INJECTION, SOLUTION INTRAVENOUS; SUBCUTANEOUS ONCE
Status: COMPLETED | OUTPATIENT
Start: 2022-07-08 | End: 2022-07-08

## 2022-07-08 RX ORDER — POTASSIUM CHLORIDE 20 MEQ/1
20 TABLET, EXTENDED RELEASE ORAL ONCE
Status: DISCONTINUED | OUTPATIENT
Start: 2022-07-08 | End: 2022-07-08

## 2022-07-08 RX ORDER — INSULIN LISPRO 100 [IU]/ML
5 INJECTION, SOLUTION INTRAVENOUS; SUBCUTANEOUS
Status: DISCONTINUED | OUTPATIENT
Start: 2022-07-08 | End: 2022-07-09

## 2022-07-08 RX ORDER — DEXTROSE MONOHYDRATE 50 MG/ML
100 INJECTION, SOLUTION INTRAVENOUS PRN
Status: DISCONTINUED | OUTPATIENT
Start: 2022-07-08 | End: 2022-07-09 | Stop reason: HOSPADM

## 2022-07-08 RX ORDER — INSULIN GLARGINE 100 [IU]/ML
15 INJECTION, SOLUTION SUBCUTANEOUS 2 TIMES DAILY
Status: DISCONTINUED | OUTPATIENT
Start: 2022-07-08 | End: 2022-07-09

## 2022-07-08 RX ADMIN — INSULIN LISPRO 6 UNITS: 100 INJECTION, SOLUTION INTRAVENOUS; SUBCUTANEOUS at 21:13

## 2022-07-08 RX ADMIN — SODIUM CHLORIDE: 9 INJECTION, SOLUTION INTRAVENOUS at 21:18

## 2022-07-08 RX ADMIN — INSULIN LISPRO 10 UNITS: 100 INJECTION, SOLUTION INTRAVENOUS; SUBCUTANEOUS at 07:14

## 2022-07-08 RX ADMIN — TRAZODONE HYDROCHLORIDE 50 MG: 50 TABLET ORAL at 00:53

## 2022-07-08 RX ADMIN — SODIUM CHLORIDE: 9 INJECTION, SOLUTION INTRAVENOUS at 00:54

## 2022-07-08 RX ADMIN — QUETIAPINE FUMARATE 50 MG: 25 TABLET ORAL at 21:08

## 2022-07-08 RX ADMIN — QUETIAPINE FUMARATE 50 MG: 25 TABLET ORAL at 00:53

## 2022-07-08 RX ADMIN — ENOXAPARIN SODIUM 40 MG: 100 INJECTION SUBCUTANEOUS at 08:21

## 2022-07-08 RX ADMIN — INSULIN LISPRO 18 UNITS: 100 INJECTION, SOLUTION INTRAVENOUS; SUBCUTANEOUS at 08:21

## 2022-07-08 RX ADMIN — INSULIN GLARGINE 15 UNITS: 100 INJECTION, SOLUTION SUBCUTANEOUS at 21:13

## 2022-07-08 RX ADMIN — INSULIN LISPRO 6 UNITS: 100 INJECTION, SOLUTION INTRAVENOUS; SUBCUTANEOUS at 16:33

## 2022-07-08 RX ADMIN — TRAZODONE HYDROCHLORIDE 50 MG: 50 TABLET ORAL at 21:08

## 2022-07-08 RX ADMIN — INSULIN GLARGINE 15 UNITS: 100 INJECTION, SOLUTION SUBCUTANEOUS at 08:21

## 2022-07-08 RX ADMIN — INSULIN LISPRO 5 UNITS: 100 INJECTION, SOLUTION INTRAVENOUS; SUBCUTANEOUS at 16:34

## 2022-07-08 RX ADMIN — LISINOPRIL 10 MG: 10 TABLET ORAL at 08:22

## 2022-07-08 RX ADMIN — INSULIN LISPRO 12 UNITS: 100 INJECTION, SOLUTION INTRAVENOUS; SUBCUTANEOUS at 11:13

## 2022-07-08 NOTE — PROGRESS NOTES
Hospitalist Progress Note    Patient:  Kym Duvall      Unit/Bed:6K-02/002-A    YOB: 1980    MRN: [id number]       Acct: [de-identified]     PCP: . None (Inactive)    Date of Admission: 7/7/2022    Assessment/Plan:    1. Acute hyperglycemia in a T2DM due to noncompliance. A1c 11.8%. No DKA/HHS. Given IV insulin in ED. Started on Lantus BID and scale. 10 extra units of Humalog given this AM. BS trend remains above goal. Add preset Humalog to regimen. Carb control meals. Dietician consult. Nursing to educate on DM, injections and survival skills. 2. Acute pseudohyponatremia due to hyperglycemia. Repeat BMP. 3. Ess HTN, ACE. 4. Bipolar / Anxiety and depression. Home Seroquel and Trazadone resumed. 5. Crack cocaine user. In remission 47 days PTA. 6. Tobacco use. 7. Obesity. BMI 35.53. Chief Complaint: Blurry vision, weakness, polydipsia and polyuria x1 week    Hospital Course:     28-year-old female patient presented to the ED today with a 1 week history of blurry vision, feeling tired,   thirsty, polydipsia, polyphagia and polyuria due to uncontrolled T 2 DM with hyperglycemia.     Patient is on metformin at home-suggestive of likely known T 2 DM.     Patient is currently undergoing substance abuse with crack cocaine rehabilitation. She is 47 days in remission. Chronic active smoker smokes about half a pack a day since the age of 11.       Initial vital signs the ED temperature 37.1 °C, respirate of 18, heart rate 110, blood pressure 141/91, oxygen saturation 90% on room air.     Initial labs serum sodium 127, potassium 3.9, chloride 89, CO2 23, BUN is 20, creatinine 0.8, blood sugar   579, serum ketone 4.48, troponin less than 0.010, lactic acid 1.5, calcium 10.0, total protein 7.7,     WBC 8.1, hemoglobin 13.3, MCV 84.3, platelets 274.     Initial twelve-lead EKG which reviewed shows normal sinus rhythm at 97 beats minute. QTC of 457 ms. Q waves in leads III and aVF. Flattened T waves in V3 V4 V5 and V6. No ST segment elevation or depression.     ED treatment included IV regular insulin x30 units and normal saline infusion. Subjective: Denies pain. No n/v/d. Feeling better. Medications:  Reviewed    Infusion Medications    dextrose      sodium chloride      sodium chloride 100 mL/hr at 07/08/22 0526     Scheduled Medications    insulin glargine  15 Units SubCUTAneous BID    insulin lispro  5 Units SubCUTAneous TID WC    sodium chloride flush  10 mL IntraVENous 2 times per day    enoxaparin  40 mg SubCUTAneous Daily    insulin lispro  0-18 Units SubCUTAneous TID WC    insulin lispro  0-9 Units SubCUTAneous Nightly    nicotine  1 patch TransDERmal Daily    lisinopril  10 mg Oral Daily    QUEtiapine  50 mg Oral Nightly     PRN Meds: glucose, dextrose bolus **OR** dextrose bolus, glucagon (rDNA), dextrose, sodium chloride flush, sodium chloride, ondansetron **OR** ondansetron, polyethylene glycol, acetaminophen **OR** acetaminophen, traZODone      Intake/Output Summary (Last 24 hours) at 7/8/2022 1407  Last data filed at 7/8/2022 1355  Gross per 24 hour   Intake 692.58 ml   Output 1300 ml   Net -607.42 ml       Diet:  ADULT DIET; Regular; 4 carb choices (60 gm/meal)    Exam:  /86   Pulse 95   Temp 98.2 °F (36.8 °C) (Oral)   Resp 18   Ht 5' 4\" (1.626 m)   Wt 207 lb 0.2 oz (93.9 kg)   SpO2 97%   BMI 35.53 kg/m²     General appearance: No apparent distress, appears stated age and cooperative. HEENT: Pupils equal, round, and reactive to light. Conjunctivae/corneas clear. Neck: Supple, with full range of motion. No jugular venous distention. Trachea midline. Respiratory:  Normal respiratory effort. Clear to auscultation, bilaterally without Rales/Wheezes/Rhonchi. Cardiovascular: Regular rate and rhythm with normal S1/S2 without murmurs, rubs or gallops. Abdomen: Soft, obese, non-tender, non-distended with normal bowel sounds.   Musculoskeletal: passive and active ROM x 4 extremities. Skin: Skin color, texture, turgor normal.  No rashes or lesions. Neurologic:  Neurovascularly intact without any focal sensory/motor deficits. Cranial nerves: II-XII intact, grossly non-focal.  Psychiatric: Alert and oriented, thought content appropriate, normal insight  Capillary Refill: Brisk,< 3 seconds   Peripheral Pulses: +2 palpable, equal bilaterally       Labs:   Recent Labs     07/07/22  1340   WBC 8.1   HGB 13.3   HCT 40.4        Recent Labs     07/07/22  1340   *   K 3.9   CL 89*   CO2 23   BUN 20   CREATININE 0.8   CALCIUM 10.0     Recent Labs     07/07/22  1340   AST 26   ALT 54   BILIDIR <0.2   BILITOT 0.4   ALKPHOS 93     No results for input(s): INR in the last 72 hours. No results for input(s): Adonica Hoose in the last 72 hours. Urinalysis:      Lab Results   Component Value Date/Time    NITRU NEGATIVE 07/07/2022 04:46 PM    WBCUA NONE SEEN 07/07/2022 04:46 PM    BACTERIA NONE SEEN 07/07/2022 04:46 PM    RBCUA 10-15 07/07/2022 04:46 PM    BLOODU MODERATE 07/07/2022 04:46 PM    SPECGRAV 1.026 03/15/2022 06:55 PM    GLUCOSEU >= 1000 07/07/2022 04:46 PM       Radiology:  No orders to display       Diet: ADULT DIET;  Regular; 4 carb choices (60 gm/meal)    DVT prophylaxis: [x] Lovenox                                 [] SCDs                                 [] SQ Heparin                                 [] Encourage ambulation           [] Already on Anticoagulation     Disposition:    [x] Home       [] TCU       [] Rehab       [] Psych       [] SNF       [] [place]       [] Other-    Code Status: Full Code            Electronically signed by FEDERICO King CNP on 7/8/2022 at 2:07 PM

## 2022-07-08 NOTE — H&P
Patient: Cecille Catalan    Unit/Bed: 6K-02/002-A    YOB: 1980    MRN: [id number]    Acct: [de-identified]     Admitting Diagnosis: Hyperglycemia [R73.9]  General weakness [R53.1]  Decreased visual acuity [H54.7]  Hyperglycemia due to type 2 diabetes mellitus ([facility] [address] [E11.65]  Fatigue, unspecified type [R53.83]  Moderate nonproliferative diabetic retinopathy of both eyes associated with diabetes mellitus due to underlying condition, macular edema presence unspecified ([facility] [address] [E08.3393]    Admit Date:  7/7/2022    CC: Blurry vision, weakness, polydipsia and polyuria x1 week    HPI :  43-year-old female patient presented to the ED today with a 1 week history of blurry vision, feeling tired,   thirsty, polydipsia, polyphagia and polyuria due to uncontrolled T 2 DM with hyperglycemia. Patient is on metformin at home-suggestive of likely known T 2 DM. Patient is currently undergoing substance abuse with crack cocaine rehabilitation. She is 47 days in remission. Chronic active smoker smokes about half a pack a day since the age of 11. Initial vital signs the ED temperature 37.1 °C, respirate of 18, heart rate 110, blood pressure 141/91, oxygen saturation 90% on room air. Initial labs serum sodium 127, potassium 3.9, chloride 89, CO2 23, BUN is 20, creatinine 0.8, blood sugar   579, serum ketone 4.48, troponin less than 0.010, lactic acid 1.5, calcium 10.0, total protein 7.7,    WBC 8.1, hemoglobin 13.3, MCV 84.3, platelets 225. Initial twelve-lead EKG which reviewed shows normal sinus rhythm at 97 beats minute. QTC of 457 ms. Q waves in leads III and aVF. Flattened T waves in V3 V4 V5 and V6. No ST segment elevation or depression. ED treatment included IV regular insulin x30 units and normal saline infusion. Review of Systems   Constitutional: Positive for fatigue. HENT: Negative. Eyes: Positive for visual disturbance. Respiratory: Negative.     Cardiovascular: Negative. Gastrointestinal: Negative. Endocrine: Positive for polydipsia, polyphagia and polyuria. Genitourinary: Positive for frequency. Musculoskeletal: Negative. Skin: Negative. Allergic/Immunologic: Negative. Neurological: Negative. Hematological: Negative. Psychiatric/Behavioral: Negative. All other systems reviewed and are negative. Past Medical History:        Diagnosis Date    Anxiety     Bipolar 1 disorder ([facility] [address]     Depression        Past Surgical History:        Procedure Laterality Date    OPEN TREATMENT BIMALLEOLAR ANKLE FRACTURE Right 9/2/2018    ANKLE OPEN REDUCTION INTERNAL FIXATION performed by Rocco Escobedo MD at [address] Right 9/2/2018    RADIUS OPEN REDUCTION INTERNAL FIXATION performed by Rocco Escobedo MD at [place] [address]       Medications Prior to Admission:    Prior to Admission medications    Medication Sig Start Date End Date Taking? Authorizing Provider   QUEtiapine (SEROQUEL) 50 MG tablet Take 50 mg by mouth nightly    Historical Provider, MD   lisinopril (PRINIVIL;ZESTRIL) 10 MG tablet Take 1 tablet by mouth daily 3/22/22   Renetta Mcclellan MD   metFORMIN (GLUCOPHAGE) 500 MG tablet Take 1 tablet by mouth 2 times daily (with meals)  Patient not taking: Reported on 7/7/2022 3/21/22   Renetta Mcclellan MD   Multiple Vitamins-Minerals (THERAPEUTIC MULTIVITAMIN-MINERALS) tablet Take 1 tablet by mouth daily 3/22/22   Renetta Mcclellan MD   thiamine mononitrate (THIAMINE) 100 MG tablet Take 1 tablet by mouth daily 3/22/22   Renetta Mcclellan MD   traZODone (DESYREL) 50 MG tablet Take 1 tablet by mouth nightly as needed for Sleep 3/21/22   Renetta Mcclellan MD       Allergies:    Patient has no known allergies. Social History:    TOBACCO:   reports that she has been smoking cigarettes. She has been smoking about 1.00 pack per day. She has never used smokeless tobacco.    ETOH:   reports current alcohol use.     Family History:    History reviewed. No pertinent family history. Objective:   /85   Pulse 80   Temp 97.7 °F (36.5 °C) (Oral)   Resp 16   Ht 5' 4\" (1.626 m)   Wt 200 lb (90.7 kg)   SpO2 99%   BMI 34.33 kg/m²   No intake or output data in the 24 hours ending 07/08/22 0329    Physical Exam  Vitals and nursing note reviewed. Constitutional:       General: She is not in acute distress. Appearance: Normal appearance. She is obese. She is not ill-appearing, toxic-appearing or diaphoretic. HENT:      Head: Normocephalic and atraumatic. Right Ear: External ear normal.      Left Ear: External ear normal.      Nose: Nose normal. No congestion or rhinorrhea. Mouth/Throat:      Mouth: Mucous membranes are moist.      Pharynx: Oropharynx is clear. No oropharyngeal exudate or posterior oropharyngeal erythema. Eyes:      General: No scleral icterus. Right eye: No discharge. Left eye: No discharge. Extraocular Movements: Extraocular movements intact. Conjunctiva/sclera: Conjunctivae normal.      Pupils: Pupils are equal, round, and reactive to light. Comments: Bilateral premature cataract   Neck:      Vascular: No carotid bruit. Cardiovascular:      Rate and Rhythm: Normal rate and regular rhythm. Pulses: Normal pulses. Heart sounds: Normal heart sounds. No murmur heard. No friction rub. No gallop. Pulmonary:      Effort: Pulmonary effort is normal. No respiratory distress. Breath sounds: Normal breath sounds. No stridor. No wheezing, rhonchi or rales. Chest:      Chest wall: No tenderness. Abdominal:      General: Bowel sounds are normal. There is no distension. Palpations: Abdomen is soft. There is no mass. Tenderness: There is no abdominal tenderness. There is no right CVA tenderness, left CVA tenderness, guarding or rebound. Hernia: No hernia is present. Musculoskeletal:         General: No swelling, tenderness, deformity or signs of injury.  Normal range of motion. Cervical back: Normal range of motion and neck supple. No rigidity or tenderness. Right lower leg: No edema. Left lower leg: No edema. Lymphadenopathy:      Cervical: No cervical adenopathy. Skin:     General: Skin is warm. Coloration: Skin is not jaundiced or pale. Findings: No bruising, erythema, lesion or rash. Neurological:      General: No focal deficit present. Mental Status: She is alert and oriented to person, place, and time. Mental status is at baseline. Cranial Nerves: No cranial nerve deficit. Sensory: No sensory deficit. Motor: No weakness. Coordination: Coordination normal.      Gait: Gait normal.      Deep Tendon Reflexes: Reflexes normal.   Psychiatric:         Mood and Affect: Mood normal.         Behavior: Behavior normal.         Thought Content: Thought content normal.         Judgment: Judgment normal.     :    Medications:       Continuous Infusions:    PRN Meds:    Data:    CBC:   Recent Labs     07/07/22  1340   WBC 8.1   RBC 4.79   HGB 13.3   HCT 40.4   MCV 84.3          BMP:   Recent Labs     07/07/22  1340   *   K 3.9   CL 89*   CO2 23   BUN 20   CREATININE 0.8       PT/INR: No results for input(s): PROTIME, INR in the last 72 hours. LIVER PROFILE:   Recent Labs     07/07/22  1340   AST 26   ALT 54   BILIDIR <0.2   BILITOT 0.4   ALKPHOS 93      Results for Marry Wellington (MRN [id number]) as of 7/8/2022 03:19   Ref. Range 7/7/2022 13:40   Beta-Hydroxybutyrate Latest Ref Range: 0.20 - 2.81 mg/dl 4.48 (H)   GLUCOSE, FASTING,GF Latest Ref Range: 70 - 108 mg/dL 579 (HH)   Hemoglobin A1C Latest Ref Range: 4.4 - 6.4 % 11.8 (H)     Results for Marry Wellington (MRN [id number]) as of 7/8/2022 03:19   Ref. Range 7/7/2022 13:40   TSH Latest Ref Range: 0.400 - 4.200 uIU/mL 0.498       ABG. None. URINALYSIS. Results for Marry Wellington (MRN [id number]) as of 7/7/2022 21:22   Ref.  Range 7/7/2022 16:46 Color, UA Latest Ref Range: STRAW-YELLOW  YELLOW   Glucose, UA Latest Ref Range: NEGATIVE mg/dl >= 1000 (A)   Bilirubin, Urine Latest Ref Range: NEGATIVE  NEGATIVE   Ketones, Urine Latest Ref Range: NEGATIVE  NEGATIVE   Blood, Urine Latest Ref Range: NEGATIVE  MODERATE (A)   pH, UA Latest Ref Range: 5.0 - 9.0  6.5   Protein, UA Latest Ref Range: NEGATIVE  NEGATIVE   Urobilinogen, Urine Latest Ref Range: 0.0 - 1.0 eu/dl 0.2   Nitrite, Urine Latest Ref Range: NEGATIVE  NEGATIVE   Leukocyte Esterase, Urine Latest Ref Range: NEGATIVE  NEGATIVE   Casts UA Latest Ref Range: NONE SEEN /lpf NONE SEEN   CASTS 2 Latest Ref Range: NONE SEEN /lpf NONE SEEN   WBC, UA Latest Ref Range: 0-4/hpf /hpf NONE SEEN   RBC, UA Latest Ref Range: 0-2/hpf /hpf 10-15   Epithelial Cells, UA Latest Ref Range: 3-5/hpf /hpf 0-2   Renal Epithelial, UA Latest Ref Range: NONE SEEN  NONE SEEN   Bacteria, UA Latest Ref Range: FEW/NONE SEEN /hpf NONE SEEN   Yeast, Urine Latest Ref Range: NONE SEEN  NONE SEEN   Crystals, UA Latest Ref Range: NONE SEEN  NONE SEEN   Character, Urine Latest Ref Range: CLEAR-SL CLOUD  CLEAR   Specific Gravity, Urine Latest Ref Range: 1.002 - 1.030  > 1.030 (A)   MISCELLANEOUS 2 Unknown NONE SEEN     Results for Sandy Ware (MRN [id number]) as of 7/8/2022 03:19   Ref. Range 7/7/2022 13:40   Preg, Serum Latest Ref Range: NEGATIVE  NEGATIVE     SEROLOGY  None. TUMOR MARKERS. None. MICROBIOLOGY   None. HISTOPATHOLOGY. None. TOXICOLOGY. Results for Sandy Ware (MRN [id number]) as of 7/8/2022 03:19   Ref.  Range 7/7/2022 16:46   AMPHETAMINE+METHAMPHETAMINE URINE SCREEN Latest Ref Range: NEGATIVE  Negative   Barbiturate Quant, Ur Latest Ref Range: NEGATIVE  Negative   Benzodiazepine Quant, Ur Latest Ref Range: NEGATIVE  Negative   Cannabinoid Quant, Ur Latest Ref Range: NEGATIVE  Negative   Cocaine Metab Quant, Ur Latest Ref Range: NEGATIVE  Negative   PCP Quant, Ur Latest Ref Range: NEGATIVE  Negative Opiates, Urine Latest Ref Range: NEGATIVE  Negative   URINE DRUG SCREEN Unknown Rpt   Oxycodone Latest Ref Range: NEGATIVE  Negative     ENDOSCOPE STUDIES. None. SURGICAL PROCEDURES. None. CARDIAC PROCEDURES. None. IR PROCEDURES. None. RADIOLOGY. None. ASSESSMENT AND  PLAN. 1.CARDIOVASCULAR. Hypertension-controlled on lisinopril. 2.RENAL AND ELECTROLYTES. Acute pseudohyponatremia due to hyperglycemia-serum sodium 127 and osmolality 284.5 . IVF w/ NS. 3.ID. Urinalysis negative for UTI. 4.ENDO. Hyperglycemia due to uncontrolled T 2 DM-sliding scale insulin and Lantus. TSH 0.498 and A1c 11.8. Consult to Diabetic Nurse Educator. 5.PSYCH. Anxiety and depression     Bipolar disorder - on Seroquel and trazodone    6. LIFE STYLE. Substance abuse with crack cocaine in remission x 47 days. Chronic tobacco use disorder-nicotine patch    7. EYES. Blurry vision due to bilateral premature cataract. Outpatient follow-up with ophthalmologist within    8. NUTRITION. Obesity with a BMI 34.33-needs regular exercise diet control for weight loss management. 9.DISPO. Planned d/c to home soon.       Electronically signed by Jet Marsh MD on 7/8/2022 at 3:29 AM

## 2022-07-08 NOTE — ED NOTES
Pt resting in bed with eyes closed at this time, no concerns noted.       Elissa Noonan RN  07/07/22 9769

## 2022-07-08 NOTE — PLAN OF CARE
Problem: Discharge Planning  Goal: Discharge to home or other facility with appropriate resources  Outcome: Progressing  Flowsheets (Taken 7/7/2022 2302)  Discharge to home or other facility with appropriate resources: Refer to discharge planning if patient needs post-hospital services based on physician order or complex needs related to functional status, cognitive ability or social support system     Problem: Chronic Conditions and Co-morbidities  Goal: Patient's chronic conditions and co-morbidity symptoms are monitored and maintained or improved  Outcome: Progressing  Flowsheets (Taken 7/7/2022 2334)  Care Plan - Patient's Chronic Conditions and Co-Morbidity Symptoms are Monitored and Maintained or Improved: Monitor and assess patient's chronic conditions and comorbid symptoms for stability, deterioration, or improvement  Note: Monitoring blood glucose levels, insulin (long acting and fast acting) to be given as ordered.      Problem: Safety - Adult  Goal: Free from fall injury  Outcome: Progressing     Problem: Pain  Goal: Verbalizes/displays adequate comfort level or baseline comfort level  Outcome: Progressing

## 2022-07-08 NOTE — ED NOTES
Patient transferred to [facility] room 002 nurse informed.       Connie Redding  07/07/22 6662

## 2022-07-08 NOTE — CARE COORDINATION
7/8/22, 8:37 AM EDT  DISCHARGE PLANNING EVALUATION:    Becki Catherine       Admitted: 7/7/2022/ [facility] day: 1   Location: -02/002-A Reason for admit: Hyperglycemia [R73.9]  General weakness [R53.1]  Decreased visual acuity [H54.7]  Hyperglycemia due to type 2 diabetes mellitus (HCC) [E11.65]  Fatigue, unspecified type [R53.83]  Moderate nonproliferative diabetic retinopathy of both eyes associated with diabetes mellitus due to underlying condition, macular edema presence unspecified ([facility] [address] [E08.3393]   PMH:  has a past medical history of Anxiety, Bipolar 1 disorder ([facility] [address] and Depression. Barriers to Discharge:  Na+ 127, -500s. IVF, DM management. PCP: . None (Inactive)  Readmission Risk Score: 7.5 ( )%  Patient's Healthcare Decision Maker: Legal Next of Kin    Patient Goals/Plan/Treatment Preferences: Met with Diego, she is staying at [facility] and plans to return at discharge. She is independent, denies all needs. Transportation/Food Security/Housekeeping Addressed:  No issues identified. 7/8/22, 11:09 AM EDT    Possible weekend discharge. Patient goals/plan/ treatment preferences discussed by  and . Patient goals/plan/ treatment preferences reviewed with patient/ family. Patient/ family verbalize understanding of discharge plan and are in agreement with goal/plan/treatment preferences. Understanding was demonstrated using the teach back method. AVS provided by RN at time of discharge, which includes all necessary medical information pertaining to the patients current course of illness, treatment, post-discharge goals of care, and treatment preferences.      Services At/After Discharge: [employer]

## 2022-07-08 NOTE — PLAN OF CARE
Notified of glucose > 400. Lantus is ordered as BID, adjusted to include now along with 10U IV Insulin. Will give a dose of Klor to prevent hypokalemia with IV Insulin. POCT glucose 15 min and 1 hr after IV insulin given. Hypoglycemia protocol added.     Electronically signed by Radha Green PA-C on 7/8/2022 at 6:44 AM

## 2022-07-09 VITALS
WEIGHT: 213.41 LBS | RESPIRATION RATE: 18 BRPM | SYSTOLIC BLOOD PRESSURE: 137 MMHG | HEIGHT: 64 IN | DIASTOLIC BLOOD PRESSURE: 73 MMHG | TEMPERATURE: 98.8 F | HEART RATE: 78 BPM | BODY MASS INDEX: 36.43 KG/M2 | OXYGEN SATURATION: 97 %

## 2022-07-09 LAB
ANION GAP SERPL CALCULATED.3IONS-SCNC: 11 MEQ/L (ref 8–16)
ATYPICAL LYMPHOCYTES: ABNORMAL %
BASOPHILS # BLD: 1.1 %
BASOPHILS ABSOLUTE: 0.1 THOU/MM3 (ref 0–0.1)
BUN BLDV-MCNC: 11 MG/DL (ref 7–22)
CALCIUM SERPL-MCNC: 8.4 MG/DL (ref 8.5–10.5)
CHLORIDE BLD-SCNC: 103 MEQ/L (ref 98–111)
CO2: 22 MEQ/L (ref 23–33)
CREAT SERPL-MCNC: 0.6 MG/DL (ref 0.4–1.2)
EOSINOPHIL # BLD: 3.1 %
EOSINOPHILS ABSOLUTE: 0.2 THOU/MM3 (ref 0–0.4)
ERYTHROCYTE [DISTWIDTH] IN BLOOD BY AUTOMATED COUNT: 12 % (ref 11.5–14.5)
ERYTHROCYTE [DISTWIDTH] IN BLOOD BY AUTOMATED COUNT: 39.1 FL (ref 35–45)
GFR SERPL CREATININE-BSD FRML MDRD: > 90 ML/MIN/1.73M2
GLUCOSE BLD-MCNC: 129 MG/DL (ref 70–108)
GLUCOSE BLD-MCNC: 183 MG/DL (ref 70–108)
GLUCOSE BLD-MCNC: 404 MG/DL (ref 70–108)
GLUCOSE BLD-MCNC: 423 MG/DL (ref 70–108)
HCT VFR BLD CALC: 40.1 % (ref 37–47)
HEMOGLOBIN: 12.3 GM/DL (ref 12–16)
IMMATURE GRANS (ABS): 0.01 THOU/MM3 (ref 0–0.07)
IMMATURE GRANULOCYTES: 0.2 %
LYMPHOCYTES # BLD: 54.8 %
LYMPHOCYTES ABSOLUTE: 3.5 THOU/MM3 (ref 1–4.8)
MCH RBC QN AUTO: 27.2 PG (ref 26–33)
MCHC RBC AUTO-ENTMCNC: 30.7 GM/DL (ref 32.2–35.5)
MCV RBC AUTO: 88.5 FL (ref 81–99)
MONOCYTES # BLD: 10 %
MONOCYTES ABSOLUTE: 0.6 THOU/MM3 (ref 0.4–1.3)
NUCLEATED RED BLOOD CELLS: 0 /100 WBC
PLATELET # BLD: 329 THOU/MM3 (ref 130–400)
PLATELET ESTIMATE: ADEQUATE
PMV BLD AUTO: 10.6 FL (ref 9.4–12.4)
POTASSIUM SERPL-SCNC: 4 MEQ/L (ref 3.5–5.2)
RBC # BLD: 4.53 MILL/MM3 (ref 4.2–5.4)
SCAN OF BLOOD SMEAR: NORMAL
SEG NEUTROPHILS: 30.8 %
SEGMENTED NEUTROPHILS ABSOLUTE COUNT: 2 THOU/MM3 (ref 1.8–7.7)
SODIUM BLD-SCNC: 136 MEQ/L (ref 135–145)
WBC # BLD: 6.4 THOU/MM3 (ref 4.8–10.8)

## 2022-07-09 PROCEDURE — 36415 COLL VENOUS BLD VENIPUNCTURE: CPT

## 2022-07-09 PROCEDURE — 6370000000 HC RX 637 (ALT 250 FOR IP): Performed by: PHYSICIAN ASSISTANT

## 2022-07-09 PROCEDURE — 85025 COMPLETE CBC W/AUTO DIFF WBC: CPT

## 2022-07-09 PROCEDURE — 6370000000 HC RX 637 (ALT 250 FOR IP): Performed by: INTERNAL MEDICINE

## 2022-07-09 PROCEDURE — 99239 HOSP IP/OBS DSCHRG MGMT >30: CPT | Performed by: NURSE PRACTITIONER

## 2022-07-09 PROCEDURE — 80048 BASIC METABOLIC PNL TOTAL CA: CPT

## 2022-07-09 PROCEDURE — 6370000000 HC RX 637 (ALT 250 FOR IP): Performed by: NURSE PRACTITIONER

## 2022-07-09 PROCEDURE — 82948 REAGENT STRIP/BLOOD GLUCOSE: CPT

## 2022-07-09 PROCEDURE — 96361 HYDRATE IV INFUSION ADD-ON: CPT

## 2022-07-09 PROCEDURE — G0378 HOSPITAL OBSERVATION PER HR: HCPCS

## 2022-07-09 RX ORDER — BLOOD-GLUCOSE METER
1 KIT MISCELLANEOUS DAILY
Qty: 250 EACH | Refills: 0 | Status: SHIPPED | OUTPATIENT
Start: 2022-07-09 | End: 2022-07-11 | Stop reason: SDUPTHER

## 2022-07-09 RX ORDER — BLOOD-GLUCOSE METER
1 KIT MISCELLANEOUS
Qty: 1 KIT | Refills: 0 | Status: SHIPPED | OUTPATIENT
Start: 2022-07-09 | End: 2022-07-11 | Stop reason: SDUPTHER

## 2022-07-09 RX ORDER — NYSTATIN 100000 U/G
CREAM TOPICAL 2 TIMES DAILY
Status: DISCONTINUED | OUTPATIENT
Start: 2022-07-09 | End: 2022-07-09 | Stop reason: HOSPADM

## 2022-07-09 RX ORDER — LANCETS 28 GAUGE
1 EACH MISCELLANEOUS DAILY
Qty: 250 EACH | Refills: 0 | Status: SHIPPED | OUTPATIENT
Start: 2022-07-09 | End: 2022-07-11 | Stop reason: SDUPTHER

## 2022-07-09 RX ORDER — FLUCONAZOLE 100 MG/1
150 TABLET ORAL ONCE
Status: COMPLETED | OUTPATIENT
Start: 2022-07-09 | End: 2022-07-09

## 2022-07-09 RX ORDER — INSULIN GLARGINE 100 [IU]/ML
15 INJECTION, SOLUTION SUBCUTANEOUS 2 TIMES DAILY
Qty: 5 PEN | Refills: 0 | Status: SHIPPED | OUTPATIENT
Start: 2022-07-09 | End: 2022-07-09 | Stop reason: SDUPTHER

## 2022-07-09 RX ORDER — INSULIN GLARGINE 100 [IU]/ML
20 INJECTION, SOLUTION SUBCUTANEOUS 2 TIMES DAILY
Status: DISCONTINUED | OUTPATIENT
Start: 2022-07-09 | End: 2022-07-09 | Stop reason: HOSPADM

## 2022-07-09 RX ORDER — INSULIN LISPRO 100 [IU]/ML
10 INJECTION, SOLUTION INTRAVENOUS; SUBCUTANEOUS
Status: DISCONTINUED | OUTPATIENT
Start: 2022-07-09 | End: 2022-07-09 | Stop reason: HOSPADM

## 2022-07-09 RX ORDER — INSULIN LISPRO 100 [IU]/ML
5 INJECTION, SOLUTION INTRAVENOUS; SUBCUTANEOUS
Qty: 3 PEN | Refills: 0 | Status: SHIPPED | OUTPATIENT
Start: 2022-07-09 | End: 2022-07-11 | Stop reason: SDUPTHER

## 2022-07-09 RX ORDER — INSULIN GLARGINE 100 [IU]/ML
15 INJECTION, SOLUTION SUBCUTANEOUS 2 TIMES DAILY
Qty: 5 PEN | Refills: 0 | Status: SHIPPED | OUTPATIENT
Start: 2022-07-09 | End: 2022-07-11 | Stop reason: SDUPTHER

## 2022-07-09 RX ORDER — INSULIN GLARGINE 100 [IU]/ML
25 INJECTION, SOLUTION SUBCUTANEOUS 2 TIMES DAILY
Status: DISCONTINUED | OUTPATIENT
Start: 2022-07-09 | End: 2022-07-09

## 2022-07-09 RX ADMIN — FLUCONAZOLE 150 MG: 100 TABLET ORAL at 15:14

## 2022-07-09 RX ADMIN — INSULIN GLARGINE 15 UNITS: 100 INJECTION, SOLUTION SUBCUTANEOUS at 09:57

## 2022-07-09 RX ADMIN — INSULIN LISPRO 10 UNITS: 100 INJECTION, SOLUTION INTRAVENOUS; SUBCUTANEOUS at 16:40

## 2022-07-09 RX ADMIN — INSULIN LISPRO 10 UNITS: 100 INJECTION, SOLUTION INTRAVENOUS; SUBCUTANEOUS at 11:12

## 2022-07-09 RX ADMIN — NYSTATIN: 100000 CREAM TOPICAL at 15:14

## 2022-07-09 RX ADMIN — INSULIN LISPRO 3 UNITS: 100 INJECTION, SOLUTION INTRAVENOUS; SUBCUTANEOUS at 11:12

## 2022-07-09 RX ADMIN — INSULIN LISPRO 5 UNITS: 100 INJECTION, SOLUTION INTRAVENOUS; SUBCUTANEOUS at 08:16

## 2022-07-09 RX ADMIN — METFORMIN HYDROCHLORIDE 500 MG: 500 TABLET ORAL at 16:44

## 2022-07-09 RX ADMIN — METFORMIN HYDROCHLORIDE 500 MG: 500 TABLET ORAL at 11:13

## 2022-07-09 RX ADMIN — INSULIN LISPRO 18 UNITS: 100 INJECTION, SOLUTION INTRAVENOUS; SUBCUTANEOUS at 08:15

## 2022-07-09 RX ADMIN — LISINOPRIL 10 MG: 10 TABLET ORAL at 10:00

## 2022-07-09 NOTE — PLAN OF CARE
Problem: Discharge Planning  Goal: Discharge to home or other facility with appropriate resources  Outcome: Progressing  Flowsheets (Taken 7/8/2022 2103 by Fernando Bell RN)  Discharge to home or other facility with appropriate resources: Identify barriers to discharge with patient and caregiver     Problem: Discharge Planning  Goal: Discharge to home or other facility with appropriate resources  Outcome: Progressing  Flowsheets (Taken 7/8/2022 2103 by Fernando Bell RN)  Discharge to home or other facility with appropriate resources: Identify barriers to discharge with patient and caregiver     Problem: Chronic Conditions and Co-morbidities  Goal: Patient's chronic conditions and co-morbidity symptoms are monitored and maintained or improved  Outcome: Progressing  Flowsheets (Taken 7/8/2022 2103 by Fernando Bell RN)  Care Plan - Patient's Chronic Conditions and Co-Morbidity Symptoms are Monitored and Maintained or Improved: Monitor and assess patient's chronic conditions and comorbid symptoms for stability, deterioration, or improvement     Problem: Chronic Conditions and Co-morbidities  Goal: Patient's chronic conditions and co-morbidity symptoms are monitored and maintained or improved  Outcome: Progressing  Flowsheets (Taken 7/8/2022 2103 by Fernando Bell RN)  Care Plan - Patient's Chronic Conditions and Co-Morbidity Symptoms are Monitored and Maintained or Improved: Monitor and assess patient's chronic conditions and comorbid symptoms for stability, deterioration, or improvement     Problem: Safety - Adult  Goal: Free from fall injury  Outcome: Progressing     Problem: Safety - Adult  Goal: Free from fall injury  Outcome: Progressing

## 2022-07-09 NOTE — PROGRESS NOTES
Nutrition Education  Consult received for ADA diet education, on metformin PTA, A1C (7/7/22) 11.8%  Patient reports regular intake of sugar sweetened cereals, regular pop and juices, skipping meals and reports willing to make eating habit changes   · Educated on carbohydrate counting (45 grams CHO/ meal), heart healthy diet guidelines  · Learners: Patient  · Readiness: Acceptance  · Method: Explanation and Handout  · Response: Verbalizes Understanding  · Contact name and number provided.     Dee Burt RD, LD  Contact Number: [phone]

## 2022-07-09 NOTE — PROGRESS NOTES
Hospitalist Progress Note    Patient:  Huy Richards      Unit/Bed:6K-02/002-A    YOB: 1980    MRN: [id number]       Acct: [de-identified]     PCP: . None (Inactive)    Date of Admission: 7/7/2022    Assessment/Plan:    1. New onset T2DM with acute hyperglycemia due to noncompliance. A1c 11.8%. No DKA/HHS. BS trend remains elevated. .Increase basal bolus regimen. Carb control meals. Dietician consulted. Nursing to educate on DM, injections and survival skills. Will need diabetic clinic on discharge. 2. Vaginal candidiasis. Fluconazole x 1. Topical cream for comfort BID. 3. Diarrhea. Check stool by PCR if able. 4. Acute pseudohyponatremia due to hyperglycemia, resolved. 5. Ess HTN, ACE. 6. Bipolar / Anxiety and depression. Home Seroquel and Trazadone resumed. 7. Crack cocaine user. In remission 47 days PTA. 8. Tobacco use. 9. Obesity. BMI 35.53. Dispo: possibly home later today if BS trend remains acceptable. Addendum:  BS trend greatly improved. Home today with Metformin  Lantus 15 units BID and Humalog 5 units TID with meals, rx given. RX given for meter, strips and lancets. Referral given to the diabetic clinic for education. Condition stable. Discharge time 38 min. Chief Complaint: Blurry vision, weakness, polydipsia and polyuria x1 week    Hospital Course:     24-year-old female patient presented to the ED today with a 1 week history of blurry vision, feeling tired,   thirsty, polydipsia, polyphagia and polyuria due to uncontrolled T 2 DM with hyperglycemia.     Patient is on metformin at home-suggestive of likely known T 2 DM.     Patient is currently undergoing substance abuse with crack cocaine rehabilitation. She is 47 days in remission.     Chronic active smoker smokes about half a pack a day since the age of 11.       Initial vital signs the ED temperature 37.1 °C, respirate of 18, heart rate 110, blood pressure 141/91, oxygen saturation 90% on room air.     Initial labs serum sodium 127, potassium 3.9, chloride 89, CO2 23, BUN is 20, creatinine 0.8, blood sugar   579, serum ketone 4.48, troponin less than 0.010, lactic acid 1.5, calcium 10.0, total protein 7.7,     WBC 8.1, hemoglobin 13.3, MCV 84.3, platelets 557.     Initial twelve-lead EKG which reviewed shows normal sinus rhythm at 97 beats minute. QTC of 457 ms. Q waves in leads III and aVF. Flattened T waves in V3 V4 V5 and V6. No ST segment elevation or depression.     ED treatment included IV regular insulin x30 units and normal saline infusion. Subjective: Having diarrhea since yesterday. Reports she was recently on ATB therapy. No n/v. Wants to go home. Medications:  Reviewed    Infusion Medications    dextrose      sodium chloride      sodium chloride 100 mL/hr at 07/08/22 2118     Scheduled Medications    insulin lispro  10 Units SubCUTAneous TID WC    insulin glargine  25 Units SubCUTAneous BID    metFORMIN  500 mg Oral BID WC    fluconazole  150 mg Oral Once    nystatin   Topical BID    sodium chloride flush  10 mL IntraVENous 2 times per day    enoxaparin  40 mg SubCUTAneous Daily    insulin lispro  0-18 Units SubCUTAneous TID WC    insulin lispro  0-9 Units SubCUTAneous Nightly    nicotine  1 patch TransDERmal Daily    lisinopril  10 mg Oral Daily    QUEtiapine  50 mg Oral Nightly     PRN Meds: glucose, dextrose bolus **OR** dextrose bolus, glucagon (rDNA), dextrose, sodium chloride flush, sodium chloride, ondansetron **OR** ondansetron, polyethylene glycol, acetaminophen **OR** acetaminophen, traZODone      Intake/Output Summary (Last 24 hours) at 7/9/2022 1343  Last data filed at 7/9/2022 1242  Gross per 24 hour   Intake 580 ml   Output 2250 ml   Net -1670 ml       Diet:  ADULT DIET;  Regular; 4 carb choices (60 gm/meal)    Exam:  /64   Pulse 87   Temp 98.4 °F (36.9 °C) (Oral)   Resp 18   Ht 5' 4\" (1.626 m)   Wt 213 lb 6.5 oz (96.8 kg)   SpO2 100% BMI 36.63 kg/m²     General appearance: No apparent distress, appears stated age and cooperative. HEENT: Pupils equal, round, and reactive to light. Conjunctivae/corneas clear. Neck: Supple, with full range of motion. No jugular venous distention. Trachea midline. Respiratory:  Normal respiratory effort. Clear to auscultation, bilaterally without Rales/Wheezes/Rhonchi. Cardiovascular: Regular rate and rhythm with normal S1/S2 without murmurs, rubs or gallops. Abdomen: Soft, obese, non-tender, non-distended with normal bowel sounds. Musculoskeletal: passive and active ROM x 4 extremities. Skin: Skin color, texture, turgor normal.  No rashes or lesions. Neurologic:  Neurovascularly intact without any focal sensory/motor deficits. Cranial nerves: II-XII intact, grossly non-focal.  Psychiatric: Alert and oriented, thought content appropriate, normal insight  Capillary Refill: Brisk,< 3 seconds   Peripheral Pulses: +2 palpable, equal bilaterally       Labs:   Recent Labs     07/07/22  1340 07/09/22  0553   WBC 8.1 6.4   HGB 13.3 12.3   HCT 40.4 40.1    329     Recent Labs     07/07/22  1340 07/09/22  0553   * 136   K 3.9 4.0   CL 89* 103   CO2 23 22*   BUN 20 11   CREATININE 0.8 0.6   CALCIUM 10.0 8.4*     Recent Labs     07/07/22  1340   AST 26   ALT 54   BILIDIR <0.2   BILITOT 0.4   ALKPHOS 93     No results for input(s): INR in the last 72 hours. No results for input(s): Cherre Gash in the last 72 hours. Urinalysis:      Lab Results   Component Value Date/Time    NITRU NEGATIVE 07/07/2022 04:46 PM    WBCUA NONE SEEN 07/07/2022 04:46 PM    BACTERIA NONE SEEN 07/07/2022 04:46 PM    RBCUA 10-15 07/07/2022 04:46 PM    BLOODU MODERATE 07/07/2022 04:46 PM    SPECGRAV 1.026 03/15/2022 06:55 PM    GLUCOSEU >= 1000 07/07/2022 04:46 PM       Radiology:  No orders to display       Diet: ADULT DIET;  Regular; 4 carb choices (60 gm/meal)    DVT prophylaxis: [x] Lovenox [] SCDs                                 [] SQ Heparin                                 [] Encourage ambulation           [] Already on Anticoagulation     Disposition:    [x] Home       [] TCU       [] Rehab       [] Psych       [] SNF       [] [place]       [] Other-    Code Status: Full Code            Electronically signed by FEDERICO Cali CNP on 7/9/2022 at 1:43 PM

## 2022-07-11 RX ORDER — BLOOD-GLUCOSE METER
1 KIT MISCELLANEOUS DAILY
Qty: 250 EACH | Refills: 0 | Status: SHIPPED | OUTPATIENT
Start: 2022-07-11

## 2022-07-11 RX ORDER — INSULIN GLARGINE 100 [IU]/ML
15 INJECTION, SOLUTION SUBCUTANEOUS 2 TIMES DAILY
Qty: 5 PEN | Refills: 0 | Status: SHIPPED | OUTPATIENT
Start: 2022-07-11 | End: 2023-07-11

## 2022-07-11 RX ORDER — INSULIN LISPRO 100 [IU]/ML
5 INJECTION, SOLUTION INTRAVENOUS; SUBCUTANEOUS
Qty: 3 PEN | Refills: 0 | Status: SHIPPED | OUTPATIENT
Start: 2022-07-11

## 2022-07-11 RX ORDER — LANCETS 28 GAUGE
1 EACH MISCELLANEOUS DAILY
Qty: 250 EACH | Refills: 0 | Status: SHIPPED | OUTPATIENT
Start: 2022-07-11

## 2022-07-11 RX ORDER — BLOOD-GLUCOSE METER
1 KIT MISCELLANEOUS
Qty: 1 KIT | Refills: 0 | Status: SHIPPED | OUTPATIENT
Start: 2022-07-11

## 2022-07-20 ENCOUNTER — OFFICE VISIT (OUTPATIENT)
Dept: FAMILY MEDICINE CLINIC | Age: 42
End: 2022-07-20
Payer: MEDICAID

## 2022-07-20 VITALS
RESPIRATION RATE: 16 BRPM | HEIGHT: 64 IN | OXYGEN SATURATION: 98 % | HEART RATE: 90 BPM | BODY MASS INDEX: 36.7 KG/M2 | TEMPERATURE: 98.4 F | SYSTOLIC BLOOD PRESSURE: 121 MMHG | DIASTOLIC BLOOD PRESSURE: 80 MMHG | WEIGHT: 215 LBS

## 2022-07-20 DIAGNOSIS — F31.70 BIPOLAR AFFECTIVE DISORDER IN REMISSION (HCC): ICD-10-CM

## 2022-07-20 DIAGNOSIS — Z12.31 ENCOUNTER FOR SCREENING MAMMOGRAM FOR MALIGNANT NEOPLASM OF BREAST: ICD-10-CM

## 2022-07-20 DIAGNOSIS — E11.65 TYPE 2 DIABETES MELLITUS WITH HYPERGLYCEMIA, WITH LONG-TERM CURRENT USE OF INSULIN (HCC): Primary | ICD-10-CM

## 2022-07-20 DIAGNOSIS — G47.00 INSOMNIA, UNSPECIFIED TYPE: ICD-10-CM

## 2022-07-20 DIAGNOSIS — I10 BENIGN ESSENTIAL HYPERTENSION: ICD-10-CM

## 2022-07-20 DIAGNOSIS — F19.11 HISTORY OF DRUG ABUSE (HCC): ICD-10-CM

## 2022-07-20 DIAGNOSIS — F17.200 TOBACCO DEPENDENCE: ICD-10-CM

## 2022-07-20 DIAGNOSIS — Z79.4 TYPE 2 DIABETES MELLITUS WITH HYPERGLYCEMIA, WITH LONG-TERM CURRENT USE OF INSULIN (HCC): Primary | ICD-10-CM

## 2022-07-20 PROBLEM — S52.601A CLOSED FRACTURE DISTAL RADIUS AND ULNA, RIGHT, INITIAL ENCOUNTER: Status: RESOLVED | Noted: 2018-09-01 | Resolved: 2022-07-20

## 2022-07-20 PROBLEM — T07.XXXA MULTIPLE FRACTURES: Status: RESOLVED | Noted: 2018-09-01 | Resolved: 2022-07-20

## 2022-07-20 PROBLEM — F15.10 AMPHETAMINE ABUSE (HCC): Status: RESOLVED | Noted: 2022-03-16 | Resolved: 2022-07-20

## 2022-07-20 PROBLEM — S52.501A CLOSED FRACTURE DISTAL RADIUS AND ULNA, RIGHT, INITIAL ENCOUNTER: Status: RESOLVED | Noted: 2018-09-01 | Resolved: 2022-07-20

## 2022-07-20 PROBLEM — F23 ACUTE PSYCHOSIS (HCC): Status: RESOLVED | Noted: 2022-03-15 | Resolved: 2022-07-20

## 2022-07-20 PROBLEM — R79.89 PSEUDOHYPONATREMIA: Status: RESOLVED | Noted: 2022-07-08 | Resolved: 2022-07-20

## 2022-07-20 PROBLEM — F14.90 COCAINE USE: Status: RESOLVED | Noted: 2022-03-16 | Resolved: 2022-07-20

## 2022-07-20 LAB
CREATININE URINE POCT: 100
MICROALBUMIN/CREAT 24H UR: 10 MG/G{CREAT}
MICROALBUMIN/CREAT UR-RTO: <30

## 2022-07-20 PROCEDURE — 99204 OFFICE O/P NEW MOD 45 MIN: CPT | Performed by: STUDENT IN AN ORGANIZED HEALTH CARE EDUCATION/TRAINING PROGRAM

## 2022-07-20 PROCEDURE — G8427 DOCREV CUR MEDS BY ELIG CLIN: HCPCS | Performed by: STUDENT IN AN ORGANIZED HEALTH CARE EDUCATION/TRAINING PROGRAM

## 2022-07-20 PROCEDURE — G8417 CALC BMI ABV UP PARAM F/U: HCPCS | Performed by: STUDENT IN AN ORGANIZED HEALTH CARE EDUCATION/TRAINING PROGRAM

## 2022-07-20 PROCEDURE — 4004F PT TOBACCO SCREEN RCVD TLK: CPT | Performed by: STUDENT IN AN ORGANIZED HEALTH CARE EDUCATION/TRAINING PROGRAM

## 2022-07-20 PROCEDURE — 3046F HEMOGLOBIN A1C LEVEL >9.0%: CPT | Performed by: STUDENT IN AN ORGANIZED HEALTH CARE EDUCATION/TRAINING PROGRAM

## 2022-07-20 PROCEDURE — 82044 UR ALBUMIN SEMIQUANTITATIVE: CPT | Performed by: STUDENT IN AN ORGANIZED HEALTH CARE EDUCATION/TRAINING PROGRAM

## 2022-07-20 PROCEDURE — 2022F DILAT RTA XM EVC RTNOPTHY: CPT | Performed by: STUDENT IN AN ORGANIZED HEALTH CARE EDUCATION/TRAINING PROGRAM

## 2022-07-20 PROCEDURE — 1111F DSCHRG MED/CURRENT MED MERGE: CPT | Performed by: STUDENT IN AN ORGANIZED HEALTH CARE EDUCATION/TRAINING PROGRAM

## 2022-07-20 RX ORDER — IBUPROFEN 800 MG/1
TABLET ORAL
COMMUNITY

## 2022-07-20 RX ORDER — HYDROCHLOROTHIAZIDE 25 MG/1
25 TABLET ORAL DAILY
Qty: 30 TABLET | Refills: 2 | Status: SHIPPED | OUTPATIENT
Start: 2022-07-20

## 2022-07-20 RX ORDER — AMLODIPINE BESYLATE 5 MG/1
5 TABLET ORAL DAILY
Qty: 30 TABLET | Refills: 2 | Status: SHIPPED | OUTPATIENT
Start: 2022-07-20

## 2022-07-20 RX ORDER — LANOLIN ALCOHOL/MO/W.PET/CERES
CREAM (GRAM) TOPICAL
COMMUNITY
Start: 2022-07-11

## 2022-07-20 RX ORDER — DULAGLUTIDE 0.75 MG/.5ML
0.75 INJECTION, SOLUTION SUBCUTANEOUS WEEKLY
Qty: 4 PEN | Refills: 2 | Status: SHIPPED | OUTPATIENT
Start: 2022-07-20 | End: 2022-08-17 | Stop reason: SDUPTHER

## 2022-07-20 RX ORDER — AMLODIPINE BESYLATE 5 MG/1
5 TABLET ORAL DAILY
COMMUNITY
Start: 2022-05-19 | End: 2022-07-20 | Stop reason: SDUPTHER

## 2022-07-20 RX ORDER — HYDROCHLOROTHIAZIDE 25 MG/1
TABLET ORAL
COMMUNITY
Start: 2022-07-16 | End: 2022-07-20 | Stop reason: SDUPTHER

## 2022-07-20 SDOH — ECONOMIC STABILITY: TRANSPORTATION INSECURITY
IN THE PAST 12 MONTHS, HAS THE LACK OF TRANSPORTATION KEPT YOU FROM MEDICAL APPOINTMENTS OR FROM GETTING MEDICATIONS?: YES

## 2022-07-20 SDOH — ECONOMIC STABILITY: FOOD INSECURITY: WITHIN THE PAST 12 MONTHS, THE FOOD YOU BOUGHT JUST DIDN'T LAST AND YOU DIDN'T HAVE MONEY TO GET MORE.: NEVER TRUE

## 2022-07-20 SDOH — ECONOMIC STABILITY: FOOD INSECURITY: WITHIN THE PAST 12 MONTHS, YOU WORRIED THAT YOUR FOOD WOULD RUN OUT BEFORE YOU GOT MONEY TO BUY MORE.: NEVER TRUE

## 2022-07-20 SDOH — ECONOMIC STABILITY: TRANSPORTATION INSECURITY
IN THE PAST 12 MONTHS, HAS LACK OF TRANSPORTATION KEPT YOU FROM MEETINGS, WORK, OR FROM GETTING THINGS NEEDED FOR DAILY LIVING?: YES

## 2022-07-20 ASSESSMENT — PATIENT HEALTH QUESTIONNAIRE - PHQ9
6. FEELING BAD ABOUT YOURSELF - OR THAT YOU ARE A FAILURE OR HAVE LET YOURSELF OR YOUR FAMILY DOWN: 0
7. TROUBLE CONCENTRATING ON THINGS, SUCH AS READING THE NEWSPAPER OR WATCHING TELEVISION: 0
4. FEELING TIRED OR HAVING LITTLE ENERGY: 0
SUM OF ALL RESPONSES TO PHQ QUESTIONS 1-9: 0
SUM OF ALL RESPONSES TO PHQ9 QUESTIONS 1 & 2: 0
8. MOVING OR SPEAKING SO SLOWLY THAT OTHER PEOPLE COULD HAVE NOTICED. OR THE OPPOSITE, BEING SO FIGETY OR RESTLESS THAT YOU HAVE BEEN MOVING AROUND A LOT MORE THAN USUAL: 0
9. THOUGHTS THAT YOU WOULD BE BETTER OFF DEAD, OR OF HURTING YOURSELF: 0
3. TROUBLE FALLING OR STAYING ASLEEP: 0
SUM OF ALL RESPONSES TO PHQ QUESTIONS 1-9: 0
1. LITTLE INTEREST OR PLEASURE IN DOING THINGS: 0
SUM OF ALL RESPONSES TO PHQ QUESTIONS 1-9: 0
5. POOR APPETITE OR OVEREATING: 0
2. FEELING DOWN, DEPRESSED OR HOPELESS: 0
SUM OF ALL RESPONSES TO PHQ QUESTIONS 1-9: 0
10. IF YOU CHECKED OFF ANY PROBLEMS, HOW DIFFICULT HAVE THESE PROBLEMS MADE IT FOR YOU TO DO YOUR WORK, TAKE CARE OF THINGS AT HOME, OR GET ALONG WITH OTHER PEOPLE: 0

## 2022-07-20 ASSESSMENT — SOCIAL DETERMINANTS OF HEALTH (SDOH): HOW HARD IS IT FOR YOU TO PAY FOR THE VERY BASICS LIKE FOOD, HOUSING, MEDICAL CARE, AND HEATING?: NOT HARD AT ALL

## 2022-07-20 ASSESSMENT — ENCOUNTER SYMPTOMS
SHORTNESS OF BREATH: 0
VOMITING: 0
NAUSEA: 0
ABDOMINAL PAIN: 0
COUGH: 0

## 2022-07-20 NOTE — PROGRESS NOTES
control. Hypertension:  Patient does not check home BP. She is taking amlodipine 5 mg, lisinopril 10 mg, and hctz 25 mg daily without issues - needs refills of amlodipine and HCTZ. Denies missed medication doses or adverse effects. Denies headache, chest pain, shortness of breath, or dizziness. Insomnia:  Taking trazodone 50 mg prn at bedtime for sleep. Mood disorder:  Taking Seroquel 50 mg nightly. No SI or HI at this time. History of drug abuse:  Used crack cocaine in past - currently 60 days sober. Living in drug treatment facility in [facility]. Doing well. At the end of the appointment, patient states that she is going to try to follow-up with a primary care physician in [place] due to transportation reasons moving forward. Past Medical History:   Diagnosis Date    Anxiety     Bipolar 1 disorder [facility])     Depression       Past Surgical History:   Procedure Laterality Date    OPEN TREATMENT BIMALLEOLAR ANKLE FRACTURE Right 9/2/2018    ANKLE OPEN REDUCTION INTERNAL FIXATION performed by Trent Israel MD at [address] [place] [address] Drive Right 9/2/2018    RADIUS OPEN REDUCTION INTERNAL FIXATION performed by Trent Israel MD at [place] [address]       History reviewed. No pertinent family history. Social History     Tobacco Use    Smoking status: Every Day     Packs/day: 1.00     Types: Cigarettes    Smokeless tobacco: Never   Substance Use Topics    Alcohol use: Yes      Current Outpatient Medications   Medication Sig Dispense Refill    ibuprofen (ADVIL;MOTRIN) 800 MG tablet ibuprofen 800 mg tablet      magnesium oxide (MAG-OX) 400 (240 Mg) MG tablet       amLODIPine (NORVASC) 5 MG tablet Take 1 tablet by mouth in the morning. 30 tablet 2    hydroCHLOROthiazide (HYDRODIURIL) 25 MG tablet Take 1 tablet by mouth in the morning. 30 tablet 2    metFORMIN (GLUCOPHAGE) 500 MG tablet Take 1 tablet by mouth in the morning and 1 tablet in the evening. Take with meals.  60 tablet 2    Dulaglutide (TRULICITY) 9.51 XR/0.2JU SOPN Inject 0.75 mg into the skin once a week 4 pen 2    insulin lispro, 1 Unit Dial, (HUMALOG KWIKPEN) 100 UNIT/ML SOPN Inject 5 Units into the skin 3 times daily (before meals) 3 pen 0    LANTUS SOLOSTAR 100 UNIT/ML injection pen Inject 15 Units into the skin 2 times daily 5 pen 0    blood glucose test strips (FREESTYLE LITE) strip 1 each by In Vitro route daily As needed. 250 each 0    Blood Glucose Monitoring Suppl (FREESTYLE FREEDOM LITE) w/Device KIT 1 kit by Does not apply route 4 times daily (before meals and nightly) 1 kit 0    FreeStyle Lancets MISC 1 each by Does not apply route daily 250 each 0    Insulin Pen Needle 29G X 12.7MM MISC Before meals and bedtime 150 each 0    QUEtiapine (SEROQUEL) 50 MG tablet Take 50 mg by mouth nightly      lisinopril (PRINIVIL;ZESTRIL) 10 MG tablet Take 1 tablet by mouth daily 30 tablet 3    Multiple Vitamins-Minerals (THERAPEUTIC MULTIVITAMIN-MINERALS) tablet Take 1 tablet by mouth daily 30 tablet 0    thiamine mononitrate (THIAMINE) 100 MG tablet Take 1 tablet by mouth daily 30 tablet 0    traZODone (DESYREL) 50 MG tablet Take 1 tablet by mouth nightly as needed for Sleep 30 tablet 0     No current facility-administered medications for this visit.      No Known Allergies    Health Maintenance   Topic Date Due    COVID-19 Vaccine (1) Never done    Pneumococcal 0-64 years Vaccine (1 - PCV) Never done    Diabetic foot exam  Never done    HIV screen  Never done    Diabetic retinal exam  Never done    Hepatitis C screen  Never done    Hepatitis B vaccine (1 of 3 - Risk 3-dose series) Never done    DTaP/Tdap/Td vaccine (1 - Tdap) Never done    Cervical cancer screen  Never done    Flu vaccine (1) 09/01/2022    A1C test (Diabetic or Prediabetic)  10/07/2022    Lipids  07/08/2023    Diabetic microalbuminuria test  07/20/2023    Depression Monitoring  07/20/2023    Hepatitis A vaccine  Aged Out    Hib vaccine  Aged Out    Meningococcal (ACWY) vaccine Aged Out       Subjective:      Review of Systems   Constitutional:  Positive for unexpected weight change (weight gain). Negative for chills and fever. Respiratory:  Negative for cough and shortness of breath. Cardiovascular:  Negative for chest pain and leg swelling. Gastrointestinal:  Negative for abdominal pain, nausea and vomiting. Neurological:  Negative for dizziness, light-headedness and headaches. Psychiatric/Behavioral:  Positive for sleep disturbance (stable). Negative for dysphoric mood, self-injury and suicidal ideas. Objective:     Physical Exam  Vitals and nursing note reviewed. Constitutional:       General: She is not in acute distress. Appearance: Normal appearance. She is well-developed. She is obese. She is not ill-appearing or toxic-appearing. HENT:      Head: Normocephalic and atraumatic. Right Ear: Tympanic membrane, ear canal and external ear normal.      Left Ear: Tympanic membrane, ear canal and external ear normal.      Mouth/Throat:      Lips: Pink. Mouth: Mucous membranes are moist.      Dentition: Dental caries present. Pharynx: Oropharynx is clear. Uvula midline. Eyes:      General: Lids are normal.      Conjunctiva/sclera: Conjunctivae normal.      Pupils: Pupils are equal, round, and reactive to light. Cardiovascular:      Rate and Rhythm: Normal rate and regular rhythm. Pulses:           Radial pulses are 2+ on the right side and 2+ on the left side. Heart sounds: Normal heart sounds. No murmur heard. Pulmonary:      Effort: Pulmonary effort is normal.      Breath sounds: Normal breath sounds and air entry. No wheezing, rhonchi or rales. Musculoskeletal:      Cervical back: Neck supple. Right lower leg: No edema. Left lower leg: No edema. Lymphadenopathy:      Cervical: No cervical adenopathy. Skin:     General: Skin is warm and dry. Neurological:      General: No focal deficit present.       Mental Status: She is alert and oriented to person, place, and time. Gait: Gait is intact. Psychiatric:         Mood and Affect: Mood and affect normal.         Behavior: Behavior is cooperative. /80   Pulse 90   Temp 98.4 °F (36.9 °C) (Temporal)   Resp 16   Ht 5' 4\" (1.626 m)   Wt 215 lb (97.5 kg)   LMP 07/14/2022 (Approximate)   SpO2 98%   BMI 36.90 kg/m²     Assessment/Plan:   Diego was seen today for new patient, health maintenance, weight management and diabetes. Diagnoses and all orders for this visit:    Type 2 diabetes mellitus with hyperglycemia, with long-term current use of insulin ([address]  Comments:  Recently diagnosed during inpatient hospitalization with A1c of 11.8%. Uncontrolled. POCT microalbumin normal today. Home 's. Continue Lantus 15 units twice daily and metformin 500 mg BID. Will add weekly Trulicity for further BS control and weight loss. Refills sent to pharmacy, including alcohol prep pads. Patient advised to continue checking BS fasting and at mealtimes, bring log to appointments. Recheck hemoglobin A1c in 3 months. Follow up with diabetes clinic for ongoing management of diabetes and to discuss CGM. Orders:  -     POCT microalbumin  -     Hemoglobin A1C; Future  -     metFORMIN (GLUCOPHAGE) 500 MG tablet; Take 1 tablet by mouth in the morning and 1 tablet in the evening. Take with meals.  -     Alcohol prep pad  -     Dulaglutide (TRULICITY) 0.11 AR/7.4TD SOPN; Inject 0.75 mg into the skin once a week    Benign essential hypertension  Comments:  Chronic, stable. Continue current medication regimen. Labs ordered-to be completed in 3 months. Orders:  -     Comprehensive Metabolic Panel; Future  -     amLODIPine (NORVASC) 5 MG tablet; Take 1 tablet by mouth in the morning.  -     hydroCHLOROthiazide (HYDRODIURIL) 25 MG tablet; Take 1 tablet by mouth in the morning. Bipolar affective disorder in remission [facility]  Comments:  Established, stable.   Continue Seroquel as

## 2022-09-26 DIAGNOSIS — Z79.4 TYPE 2 DIABETES MELLITUS WITH HYPERGLYCEMIA, WITH LONG-TERM CURRENT USE OF INSULIN (HCC): ICD-10-CM

## 2022-09-26 DIAGNOSIS — E11.65 TYPE 2 DIABETES MELLITUS WITH HYPERGLYCEMIA, WITH LONG-TERM CURRENT USE OF INSULIN (HCC): ICD-10-CM

## 2022-09-28 ENCOUNTER — HOSPITAL ENCOUNTER (OUTPATIENT)
Age: 42
Setting detail: SPECIMEN
Discharge: HOME | End: 2022-09-28

## 2022-09-29 LAB
CHLAMYDIA BY THIN PREP: NEGATIVE
N. GONORRHOEAE DNA, THIN PREP: NEGATIVE
SPECIMEN DESCRIPTION: NORMAL

## 2022-09-30 LAB
HPV SAMPLE: NORMAL
HPV, GENOTYPE 16: NOT DETECTED
HPV, GENOTYPE 18: NOT DETECTED
HPV, HIGH RISK OTHER: NOT DETECTED
HPV, INTERPRETATION: NORMAL
SPECIMEN DESCRIPTION: NORMAL

## 2022-10-08 LAB — CYTOLOGY REPORT: NORMAL

## 2022-10-10 NOTE — TELEPHONE ENCOUNTER
[facility]

## 2023-04-26 RX ORDER — LANCETS 33 GAUGE
EACH MISCELLANEOUS
Qty: 100 EACH | OUTPATIENT
Start: 2023-04-26

## (undated) DEVICE — SPLINT CAST W5XL30IN GRN STRENGTH PLSTR OF PARIS FAST SET

## (undated) DEVICE — BANDAGE,ELASTIC,ESMARK,STERILE,4"X9',LF: Brand: MEDLINE

## (undated) DEVICE — GAUZE,SPONGE,FLUFF,6"X6.75",STRL,5/TRAY: Brand: MEDLINE

## (undated) DEVICE — POUCH INSTR W6.75XL11.5IN FRST 2 PKT ADH FOR ORTH AND

## (undated) DEVICE — Z DISCONTINUED USE 2275686 GLOVE SURG SZ 8 L12IN FNGR THK13MIL WHT ISOLEX POLYISOPRENE

## (undated) DEVICE — GLOVE ORANGE PI 8   MSG9080

## (undated) DEVICE — ZIMMER® STERILE DISPOSABLE TOURNIQUET CUFF, DUAL PORT, SINGLE BLADDER, 34 IN. (86 CM)

## (undated) DEVICE — BANDAGE CAST W4INXL5YD PLSTR OF PARIS FAST SET 5 8MIN LO

## (undated) DEVICE — TUBING, SUCTION, 9/32" X 20', STRAIGHT: Brand: MEDLINE INDUSTRIES, INC.

## (undated) DEVICE — GUIDEWIRE ORTH L150MM DIA1.25MM S STL NTHRD FOR 4MM CANN

## (undated) DEVICE — SUTURE VCRL + SZ 0 L27IN ABSRB WHT CT-2 1/2 CIR TAPERPOINT VCP270H

## (undated) DEVICE — ZIMMER® STERILE DISPOSABLE TOURNIQUET CUFF WITH PROTECTIVE SLEEVE AND PLC, DUAL PORT, SINGLE BLADDER, 24 IN. (61 CM)

## (undated) DEVICE — DRAPE,REIN 53X77,STERILE: Brand: MEDLINE

## (undated) DEVICE — BIT DRL L100MM DIA2MM QUIK CPL W/O STP REUSE

## (undated) DEVICE — Device

## (undated) DEVICE — ZIMMER® STERILE DISPOSABLE TOURNIQUET CUFF WITH PROTECTIVE SLEEVE AND PLC, DUAL PORT, SINGLE BLADDER, 18 IN. (46 CM)

## (undated) DEVICE — SOLUTION IV IRRIG POUR BRL 0.9% SODIUM CHL 2F7124

## (undated) DEVICE — BIT DRL L110MM DIA1.8MM QUIK CPL CALIB W/O STP REUSE

## (undated) DEVICE — ELECTRODE PT RET AD L9FT HI MOIST COND ADH HYDRGEL CORDED

## (undated) DEVICE — GLOVE SURG SZ 65 THK91MIL LTX FREE SYN POLYISOPRENE

## (undated) DEVICE — ORTHO EXT PK

## (undated) DEVICE — BIT DRL L110MM DIA3.5MM QUIK CPL W/O STP REUSE

## (undated) DEVICE — DRAPE,EXTREMITY,89X128,STERILE: Brand: MEDLINE

## (undated) DEVICE — PAD,ABDOMINAL,5"X9",ST,LF,25/BX: Brand: MEDLINE INDUSTRIES, INC.

## (undated) DEVICE — COUNTER NDL 10 COUNT HLD 20 FOAM BLK SGL MAG

## (undated) DEVICE — PADDING,UNDERCAST,COTTON, 3X4YD STERILE: Brand: MEDLINE

## (undated) DEVICE — PADDING,UNDERCAST,COTTON, 4"X4YD STERILE: Brand: MEDLINE

## (undated) DEVICE — ADHESIVE SKIN CLSR 0.7ML TOP DERMBND ADV

## (undated) DEVICE — SUTURE NONABSORBABLE MONOFILAMENT 3-0 PS-1 18 IN BLK ETHILON 1663H

## (undated) DEVICE — GLOVE ORANGE PI 8 1/2   MSG9085

## (undated) DEVICE — BANDAGE COMPR W6INXL12FT SMOOTH FOR LIMB EXSANG ESMARCH

## (undated) DEVICE — GLOVE ORANGE PI 7 1/2   MSG9075

## (undated) DEVICE — CHLORAPREP 26ML ORANGE

## (undated) DEVICE — SKIN SCRUB TRAY: Brand: MEDLINE INDUSTRIES, INC.

## (undated) DEVICE — SCREW BNE L12MM DIA2.7MM CORT S STL ST T8 STARDRV RECESS: Type: IMPLANTABLE DEVICE | Status: NON-FUNCTIONAL

## (undated) DEVICE — SCREW BNE L42MM DIA3.5MM CORT S STL ST NONCANNULATED LOK: Type: IMPLANTABLE DEVICE | Status: NON-FUNCTIONAL

## (undated) DEVICE — C-ARMOR C-ARM EQUIPMENT COVERS CLEAR STERILE UNIVERSAL FIT 12 PER CASE: Brand: C-ARMOR

## (undated) DEVICE — BIT DRL L110MM DIA2.5MM G QUIK CPL W/O STP REUSE

## (undated) DEVICE — SUTURE VIC + ABS BR UD X1 2-0 27IN VCP459H

## (undated) DEVICE — SYRINGE IRRIG 60ML SFT PLIABLE BLB EZ TO GRP 1 HND USE W/

## (undated) DEVICE — DRAPE C ARM UNIV W41XL74IN CLR PLAS XR VELC CLSR POLY STRP

## (undated) DEVICE — GLOVE ORANGE PI 7   MSG9070

## (undated) DEVICE — PENCIL ES L3M BTTN SWCH HOLSTER W/ BLDE ELECTRD EDGE

## (undated) DEVICE — SUTURE MCRYL SZ 3-0 L18IN ABSRB UD L19MM PS-2 3/8 CIR PRIM Y497G

## (undated) DEVICE — TOWEL,OR,DSP,ST,NATURAL,DLX,4/PK,20PK/CS: Brand: MEDLINE

## (undated) DEVICE — DRESSING,GAUZE,XEROFORM,CURAD,1"X8",ST: Brand: CURAD

## (undated) DEVICE — ZIMMER® STERILE DISPOSABLE TOURNIQUET CUFF WITH PROTECTIVE SLEEVE AND PLC, DUAL PORT, SINGLE BLADDER, 34 IN. (86 CM)

## (undated) DEVICE — GOWN,AURORA,NONRNF,XL,30/CS: Brand: MEDLINE